# Patient Record
Sex: MALE | Employment: FULL TIME | URBAN - METROPOLITAN AREA
[De-identification: names, ages, dates, MRNs, and addresses within clinical notes are randomized per-mention and may not be internally consistent; named-entity substitution may affect disease eponyms.]

---

## 2017-01-06 ENCOUNTER — GENERIC CONVERSION - ENCOUNTER (OUTPATIENT)
Dept: OTHER | Facility: OTHER | Age: 67
End: 2017-01-06

## 2017-01-12 ENCOUNTER — GENERIC CONVERSION - ENCOUNTER (OUTPATIENT)
Dept: OTHER | Facility: OTHER | Age: 67
End: 2017-01-12

## 2017-01-13 ENCOUNTER — GENERIC CONVERSION - ENCOUNTER (OUTPATIENT)
Dept: OTHER | Facility: OTHER | Age: 67
End: 2017-01-13

## 2017-01-16 ENCOUNTER — ALLSCRIPTS OFFICE VISIT (OUTPATIENT)
Dept: OTHER | Facility: OTHER | Age: 67
End: 2017-01-16

## 2017-02-02 ENCOUNTER — GENERIC CONVERSION - ENCOUNTER (OUTPATIENT)
Dept: OTHER | Facility: OTHER | Age: 67
End: 2017-02-02

## 2017-05-05 ENCOUNTER — GENERIC CONVERSION - ENCOUNTER (OUTPATIENT)
Dept: OTHER | Facility: OTHER | Age: 67
End: 2017-05-05

## 2017-05-06 LAB
A/G RATIO (HISTORICAL): 2.1 (ref 1.2–2.2)
ALBUMIN SERPL BCP-MCNC: 4.2 G/DL (ref 3.6–4.8)
ALP SERPL-CCNC: 76 IU/L (ref 39–117)
ALT SERPL W P-5'-P-CCNC: 26 IU/L (ref 0–44)
AST SERPL W P-5'-P-CCNC: 18 IU/L (ref 0–40)
BILIRUB SERPL-MCNC: 0.4 MG/DL (ref 0–1.2)
BUN SERPL-MCNC: 16 MG/DL (ref 8–27)
BUN/CREA RATIO (HISTORICAL): 18 (ref 10–24)
CALCIUM SERPL-MCNC: 9.3 MG/DL (ref 8.6–10.2)
CHLORIDE SERPL-SCNC: 99 MMOL/L (ref 96–106)
CHOLEST SERPL-MCNC: 106 MG/DL (ref 100–199)
CO2 SERPL-SCNC: 26 MMOL/L (ref 18–29)
CREAT SERPL-MCNC: 0.87 MG/DL (ref 0.76–1.27)
EGFR AFRICAN AMERICAN (HISTORICAL): 104 ML/MIN/1.73
EGFR-AMERICAN CALC (HISTORICAL): 90 ML/MIN/1.73
GLUCOSE SERPL-MCNC: 120 MG/DL (ref 65–99)
HDLC SERPL-MCNC: 38 MG/DL
LDLC SERPL CALC-MCNC: 28 MG/DL (ref 0–99)
POTASSIUM SERPL-SCNC: 4.8 MMOL/L (ref 3.5–5.2)
SODIUM SERPL-SCNC: 141 MMOL/L (ref 134–144)
TOT. GLOBULIN, SERUM (HISTORICAL): 2 G/DL (ref 1.5–4.5)
TOTAL PROTEIN (HISTORICAL): 6.2 G/DL (ref 6–8.5)
TRIGL SERPL-MCNC: 198 MG/DL (ref 0–149)

## 2017-05-07 ENCOUNTER — GENERIC CONVERSION - ENCOUNTER (OUTPATIENT)
Dept: OTHER | Facility: OTHER | Age: 67
End: 2017-05-07

## 2017-05-07 LAB
HBA1C MFR BLD HPLC: 7.1 % (ref 4.8–5.6)
INTERPRETATION (HISTORICAL): NORMAL

## 2017-05-08 ENCOUNTER — GENERIC CONVERSION - ENCOUNTER (OUTPATIENT)
Dept: OTHER | Facility: OTHER | Age: 67
End: 2017-05-08

## 2017-05-16 ENCOUNTER — ALLSCRIPTS OFFICE VISIT (OUTPATIENT)
Dept: OTHER | Facility: OTHER | Age: 67
End: 2017-05-16

## 2017-08-24 ENCOUNTER — GENERIC CONVERSION - ENCOUNTER (OUTPATIENT)
Dept: OTHER | Facility: OTHER | Age: 67
End: 2017-08-24

## 2017-08-24 LAB
A/G RATIO (HISTORICAL): 1.9 (ref 1.2–2.2)
ALBUMIN SERPL BCP-MCNC: 4.2 G/DL (ref 3.6–4.8)
ALP SERPL-CCNC: 80 IU/L (ref 39–117)
ALT SERPL W P-5'-P-CCNC: 32 IU/L (ref 0–44)
AST SERPL W P-5'-P-CCNC: 22 IU/L (ref 0–40)
BILIRUB SERPL-MCNC: 0.4 MG/DL (ref 0–1.2)
BUN SERPL-MCNC: 17 MG/DL (ref 8–27)
BUN/CREA RATIO (HISTORICAL): 21 (ref 10–24)
CALCIUM SERPL-MCNC: 9.2 MG/DL (ref 8.6–10.2)
CHLORIDE SERPL-SCNC: 101 MMOL/L (ref 96–106)
CO2 SERPL-SCNC: 23 MMOL/L (ref 18–29)
CREAT SERPL-MCNC: 0.8 MG/DL (ref 0.76–1.27)
EGFR AFRICAN AMERICAN (HISTORICAL): 107 ML/MIN/1.73
EGFR-AMERICAN CALC (HISTORICAL): 92 ML/MIN/1.73
GLUCOSE SERPL-MCNC: 155 MG/DL (ref 65–99)
HBA1C MFR BLD HPLC: 7.1 % (ref 4.8–5.6)
POTASSIUM SERPL-SCNC: 4.4 MMOL/L (ref 3.5–5.2)
SODIUM SERPL-SCNC: 141 MMOL/L (ref 134–144)
TOT. GLOBULIN, SERUM (HISTORICAL): 2.2 G/DL (ref 1.5–4.5)
TOTAL PROTEIN (HISTORICAL): 6.4 G/DL (ref 6–8.5)

## 2017-08-25 ENCOUNTER — GENERIC CONVERSION - ENCOUNTER (OUTPATIENT)
Dept: OTHER | Facility: OTHER | Age: 67
End: 2017-08-25

## 2017-08-29 ENCOUNTER — ALLSCRIPTS OFFICE VISIT (OUTPATIENT)
Dept: OTHER | Facility: OTHER | Age: 67
End: 2017-08-29

## 2017-12-05 ENCOUNTER — ALLSCRIPTS OFFICE VISIT (OUTPATIENT)
Dept: OTHER | Facility: OTHER | Age: 67
End: 2017-12-05

## 2017-12-05 LAB — HBA1C MFR BLD HPLC: 6.5 %

## 2017-12-06 NOTE — PROGRESS NOTES
Assessment    1  Benign essential hypertension (401 1) (I10)   2  Type 2 diabetes mellitus (250 00) (E11 9)   3  Hyperlipidemia (272 4) (E78 5)   4  Former smoker (V15 82) (K85 402)   5  Influenza vaccine needed (V04 81) (Z23)    Plan  Hyperlipidemia    · (1) COMPREHENSIVE METABOLIC PANEL; Status:Canceled;    · (1) LIPID PANEL FASTING W DIRECT LDL REFLEX; Status:Canceled; Influenza vaccine needed    · Fluzone High-Dose 0 5 ML Intramuscular Suspension Prefilled Syringe;INJECT 0 5  ML Subcutaneous; To Be Done: 00DIP5845  Type 2 diabetes mellitus    · (1) COMPREHENSIVE METABOLIC PANEL; Status:Active; Requested for:97Jfo9302;    · (1) HEMOGLOBIN A1C; Status:Active; Requested for:69Kqo1981;    · (1) HEMOGLOBIN A1C; Status:Canceled;    · (1) LIPID PANEL FASTING W DIRECT LDL REFLEX; Status:Active; Requestedfor:83Vue6610;    · (1) MICROALBUMIN CREATININE RATIO, RANDOM URINE; Status:Active; Requestedfor:30Zhi0377;    · Hemoglobin A1c- POC; Status:Complete;   Done: 92KZI8462 02:45PM   · Begin a limited exercise program ; Status:Complete;   Done: 66CQQ5269   · Follow-up visit in 3 months Evaluation and Treatment  Follow-up  Status: Hold For -Scheduling  Requested for: 72HSZ3485    Discussion/Summary    Hypertension - Well controlled- last levels were controlled- His sugar is well controlled  he is going to try to see how his sugar do without the Victoza after the holidays  He doesn't think the Josuerussel Pelayo is doing much for him  Chief Complaint  follow up for hypertension      History of Present Illness  he had to start lamictal about 2 months ago  His seizure activity has gone down  He was having a few a day before the lamictal  He is down to about 3 a week  The patient states his hyperlipidemia has been under good control since the last visit  Symptoms: Associated symptoms include memory loss  Medications: the patient is adherent with his medication regimen  -- He denies medication side effects     The patient presents for follow-up of essential hypertension  The patient states he has been doing well with his blood pressure control since the last visit  Symptoms: denies chest pain  Medications: the patient is adherent with his medication regimen  -- He denies medication side effects  Review of Systems   Constitutional: No fever or chills, feels well, no tiredness, no recent weight gain or weight loss  Cardiovascular: No complaints of slow heart rate, no fast heart rate, no chest pain, no palpitations, no leg claudication, no lower extremity  Active Problems  1  Benign essential hypertension (401 1) (I10)   2  BMI 32 0-32 9,adult (V85 32) (Z68 32)   3  Hyperlipidemia (272 4) (E78 5)   4  Meningioma (225 2) (D32 9)   5  Obesity (278 00) (E66 9)   6  Seizure (780 39) (R56 9)   7  Type 2 diabetes mellitus (250 00) (E11 9)    Past Medical History    1  Benign paroxysmal positional vertigo (386 11) (H81 10)   2  History of Benign paroxysmal vertigo, unspecified laterality (386 11) (H81 10)   3  History of Cellulitis of hand (682 4) (L03 119)   4  History of Cellulitis Of The Left Fifth Toe (681 10)   5  History of Colitis (558 9) (K52 9)   6  History of acute sinusitis (V12 69) (Z87 09)   7  History of acute sinusitis (V12 69) (Z87 09)   8  History of epistaxis (V12 69) (Z87 898)   9  History of viral gastroenteritis (V12 09) (Z86 19)   10  History of Incisional hernia (553 21) (K43 2)   11  Influenza vaccine needed (V04 81) (Z23)   12  Influenza vaccine needed (V04 81) (Z23)   13  Influenza vaccine needed (V04 81) (Z23)   14  History of Knee Sprain (844 9)   15  Need for pneumococcal vaccination (V03 82) (Z23)   16  History of Open Wound Of The Finger, Complicated (539 5)   17  History of Pain of finger, unspecified laterality (729 5) (M79 646)   18  History of Screening for genitourinary condition (V81 6) (Z13 89)   19  History of Skin Abscess Of Foot (682 7)   20  History of Tinnitus, bilateral (388 30) (H93 13)   21  History of Umbilical hernia (738 4) (K42 9)   22  History of Unspecified Disorder Of Soft Tissue (729 90)   23  Well adult on routine health check (V70 0) (Z00 00)    Surgical History  1  History of Craniotomy Supratentorial Excision Of Meningioma    Family History  Mother    1  Family history of Glaucoma (365 9) (H40 9)  Father    2  Family history of Colon cancer (153 9) (C18 9)   3  Family history of malignant neoplasm of urinary bladder (V16 52) (Z80 52)   4  Family history of Paget disease of bone  Maternal Grandmother    5  Family history of Glaucoma (365 9) (H40 9)  Paternal Grandmother    10  Family history of Colon cancer (153 9) (C18 9)    Social History     · Former smoker (G36 15) (T70 545)  The social history was reviewed and updated today  Current Meds   1  Accu-Chek Guide In Vitro Strip; TEST three times a day; Therapy: 81BXV8802 to (Last Rx:26Agw3291)  Requested for: 16VYC2144 Ordered   2  Accu-Chek Guide w/Device Kit; test fasting BS three times daily Dx 250; Therapy: 81EQQ8991 to (Last Rx:06Arf5800)  Requested for: 35MUU7166 Ordered   3  Aspirin 81 MG Oral Tablet Delayed Release; 1 every day Recorded   4  Atorvastatin Calcium 10 MG Oral Tablet; Take 1 tablet daily; Therapy: (Gabriel Coronado) to Recorded   5  BD Pen Needle Albina U/F 32G X 4 MM Miscellaneous; FOR USE WITH byetta twice  daily; Therapy: 19FNZ4564 to (QHELMVVQ:23BME7443)  Requested for: 32ZNK7286; Last Rx:34Apc9750 Ordered   6  Fish Oil 1200 MG Oral Capsule; 2  Every Day; Therapy: 79Blg0798 to  Requested for: 15Rsi4782 Recorded   7  LamoTRIgine 100 MG Oral Tablet; Take 1 tablet twice daily; Therapy: 07TSL1837 to Recorded   8  LevETIRAcetam  MG Oral Tablet Extended Release 24 Hour; two in pm; Therapy: 14OTQ2433 to Recorded   9  LevETIRAcetam  MG Oral Tablet Extended Release 24 Hour; take 1 tablet in am; Therapy: 33LMW3325 to Recorded   10  Losartan Potassium 100 MG Oral Tablet; Take 1 tablet daily;   Therapy: 53Qgq2572 to (Evaluate:27Agt5810)  Requested for: 53HWJ7450; Last  Rx:87Eoc0976 Ordered   11  Melatonin 1 MG Oral Capsule; TAKE 1 CAPSULE AT BEDTIME AS NEEDED; Therapy: 87KMT2813 to Recorded   12  MetFORMIN HCl - 1000 MG Oral Tablet; take 1 tablet twice a day; Therapy: 79Xow4127 to (Last Alee Dad)  Requested for: 08Wgm2585 Ordered   13  Multi-Vitamin TABS; 1 Every Day; Therapy: 33Vib2188 to  Requested for: 09EDX8973 Recorded   14  Madina-Rul Vitamin D 2000 UNIT Oral Tablet; 1 po daily; Therapy: 74CUF7273 to  Requested for: 59Xhh8674 Recorded   15  Victoza 18 MG/3ML Subcutaneous Solution Pen-injector; 1 8mg daily sc; Therapy: 24Okc7254 to (Evaluate:28Nal3144)  Requested for: 11HXP1090; Last  Rx:17Ugc9897 Ordered    Allergies  1  Amoxicillin TABS    Vitals  Vital Signs    Recorded: 46HSC5832 02:24PM   Temperature 96 F   Heart Rate 84   Respiration 16   Systolic 051   Diastolic 76   Height Unobtainable Yes   Weight Unobtainable Yes       Physical Exam   Constitutional  General appearance: No acute distress, well appearing and well nourished  Ears, Nose, Mouth, and Throat  External inspection of ears and nose: Normal    Otoscopic examination: Tympanic membrance translucent with normal light reflex  Canals patent without erythema  Oropharynx: Normal with no erythema, edema, exudate or lesions  Pulmonary  Auscultation of lungs: Clear to auscultation, equal breath sounds bilaterally, no wheezes, no rales, no rhonci  no rales or crackles were heard bilaterally  no rhonchi  no friction rub  no wheezing  Cardiovascular  Auscultation of heart: Normal rate and rhythm, normal S1 and S2, without murmurs     Musculoskeletal  Gait and station: Normal          Results/Data  Hemoglobin A1c- POC 14AHU8081 02:45PM Mayme      Test Name Result Flag Reference   HEMOGLOBIN A1C 6 5           Provider Comments    declined tetanus shot      Signatures   Electronically signed by : Gary Bae DO; Dec  5 2017  3:00PM EST (Author)

## 2018-01-10 NOTE — PROGRESS NOTES
History of Present Illness  Care Coordination Encounter Information:   Type of Encounter: Telephonic   Last Office Visit: 1/16/17   Spoke to Patient  Care Coordination [facility] Nurse Albert Staff:   The reason for call is to discuss outreach for follow up/needed services and coordination of meeting care plan treatment goals  I reached out to Ruthie Goltz as per the [employer] and had a lengthy discussion regarding his management of diabetes  He is upset that [employer] will not cover the Contour test strips any longer  He feels the Ultra II is not as accurate and in fact he is doing his own research to compare the two  He has been living with his sister since surgery to remove a meningioma  He works part time  He is very concerned regarding his health  We discuss ways to add nutrition to his daily diet and to restrict the bread to lower his A1C  I have provided contact information to him  Active Problems    1  Benign essential hypertension (401 1) (I10)   2  BMI 32 0-32 9,adult (V85 32) (Z68 32)   3  Hyperlipidemia (272 4) (E78 5)   4  Meningioma (225 2) (D32 9)   5  Obesity (278 00) (E66 9)   6  Seizure (780 39) (R56 9)   7  Type 2 diabetes mellitus (250 00) (E11 9)    Past Medical History    1  Benign paroxysmal positional vertigo (386 11) (H81 10)   2  History of Benign paroxysmal vertigo, unspecified laterality (386 11) (H81 10)   3  History of Cellulitis of hand (682 4) (L03 119)   4  History of Cellulitis Of The Left Fifth Toe (681 10)   5  History of Colitis (558 9) (K52 9)   6  History of acute sinusitis (V12 69) (Z87 09)   7  History of acute sinusitis (V12 69) (Z87 09)   8  History of epistaxis (V12 69) (Z87 898)   9  History of viral gastroenteritis (V12 09) (Z86 19)   10  History of Incisional hernia (553 21) (K43 2)   11  Influenza vaccine needed (V04 81) (Z23)   12  Influenza vaccine needed (V04 81) (Z23)   13  Influenza vaccine needed (V04 81) (Z23)   14  History of Knee Sprain (844 9)   15  Need for pneumococcal vaccination (V03 82) (Z23)   16  History of Open Wound Of The Finger, Complicated (935 5)   17  History of Pain of finger, unspecified laterality (729 5) (M79 646)   18  History of Screening for genitourinary condition (V81 6) (Z13 89)   19  History of Skin Abscess Of Foot (682 7)   20  History of Tinnitus, bilateral (388 30) (H93 13)   21  History of Umbilical hernia (155 5) (K42 9)   22  History of Unspecified Disorder Of Soft Tissue (729 90)   23  Well adult on routine health check (V70 0) (Z00 00)    Surgical History    1  History of Craniotomy Supratentorial Excision Of Meningioma    Family History  Mother    1  Family history of Glaucoma (365 9) (H40 9)  Father    2  Family history of Colon cancer (153 9) (C18 9)   3  Family history of malignant neoplasm of urinary bladder (V16 52) (Z80 52)   4  Family history of Paget disease of bone  Maternal Grandmother    5  Family history of Glaucoma (365 9) (H40 9)  Paternal Grandmother    10  Family history of Colon cancer (153 9) (C18 9)    Social History    · Former smoker (V18 25) (K96 523)    Current Meds    1  Losartan Potassium 100 MG Oral Tablet; 1 every day; Therapy: (Recorded:13Oct2016) to Recorded    2  Atorvastatin Calcium 10 MG Oral Tablet; take 1 tablet daily at bedtime; Therapy: 46PGB7630 to (Evaluate:11Jan2018)  Requested for: 51NFH8033; Last   Rx:16Jan2017 Ordered    3  LevETIRAcetam 750 MG Oral Tablet; Take 1 tablet twice daily; Therapy: (Recorded:27Nlo5159) to Recorded    4  BD Pen Needle Albina U/F 32G X 4 MM Miscellaneous; FOR USE WITH byetta twice  daily; Therapy: 79HTY5621 to (Last Rx:26Jan2017)  Requested for: 26Jan2017 Ordered   5  BD Pen Needle Albina U/F 32G X 4 MM Miscellaneous; inject levemir once daily  dx:    250 42; Therapy: 21CIC6482 to (Last ES:49KNJ3786)  Requested for: 62XYW8364 Ordered   6   Byetta 10 MCG Pen 10 MCG/0 04ML Subcutaneous Solution Pen-injector; INJECT 10   MCG UNDER THE SKIN 1 HOUR PRIOR TO MORNING AND   EVENING MEALS; Therapy: 54GKA2753 to (Last Keena Brandyn)  Requested for: 74RAJ3776 Ordered   7  Lifescan One Touch Ultra Test Strips; test twice daily; Therapy: 57BOD4263 to (Evaluate:11Jul2017)  Requested for: 27ANB5615; Last   Rx:12Jan2017 Ordered   8  Lifescan One Touch Ultramini Meter; use as directed; Therapy: 25BZT4316 to (Last Rx:12Jan2017)  Requested for: 12Jan2017 Ordered   9  MetFORMIN HCl - 1000 MG Oral Tablet; take 1 tablet twice a day; Therapy: 99Pvd6810 to (Baldemar May)  Requested for: 72ZUW1208; Last   Rx:02Jun2016 Ordered   10  OneTouch Ultra Blue In Vitro Strip; USE TO TEST TWICE A DAY; Therapy: 32KXQ8016 to (Evaluate:67Mwe4602)  Requested for: 85LFC3172; Last    Rx:92Uwa7076 Ordered    11  Aspirin 81 MG Oral Tablet Delayed Release; 1 every day Recorded   12  Fish Oil 1200 MG Oral Capsule; 2  Every Day; Therapy: 27Eph7596 to  Requested for: 07Apr2014 Recorded   13  Melatonin 1 MG Oral Capsule; TAKE 1 CAPSULE AT BEDTIME AS NEEDED; Therapy: 34HTF7370 to Recorded   14  Multi-Vitamin TABS; 1 Every Day; Therapy: 65Kdv5993 to  Requested for: 05FQE9178 Recorded   15  Madina-Rul Vitamin D 2000 UNIT Oral Tablet; 1 po daily; Therapy: 79XGG2320 to  Requested for: 58Yrd6034 Recorded    Allergies    1  Amoxicillin TABS    End of Encounter Meds    1  Losartan Potassium 100 MG Oral Tablet; 1 every day; Therapy: (Recorded:13Oct2016) to Recorded    2  Atorvastatin Calcium 10 MG Oral Tablet; take 1 tablet daily at bedtime; Therapy: 08CEJ3700 to (Evaluate:11Jan2018)  Requested for: 46ZET0324; Last   Rx:16Jan2017 Ordered    3  LevETIRAcetam 750 MG Oral Tablet; Take 1 tablet twice daily; Therapy: (Recorded:06Kca6128) to Recorded    4  BD Pen Needle Albina U/F 32G X 4 MM Miscellaneous; FOR USE WITH byetta twice  daily; Therapy: 34GLR3106 to (Last Rx:26Jan2017)  Requested for: 26Jan2017 Ordered   5   BD Pen Needle Albina U/F 32G X 4 MM Miscellaneous; inject levemir once daily dx:    250 42; Therapy: 01EDR6342 to (Last JU:50XRB3711)  Requested for: 58VUS8685 Ordered   6  Byetta 10 MCG Pen 10 MCG/0 04ML Subcutaneous Solution Pen-injector; INJECT 10   MCG UNDER THE SKIN 1 HOUR PRIOR TO    MORNING AND   EVENING MEALS; Therapy: 38VKG2325 to (Last Geofm Christen)  Requested for: 31HBV7372 Ordered   7  Lifescan One Touch Ultra Test Strips; test twice daily; Therapy: 31MEN3239 to (Evaluate:78Ann9906)  Requested for: 09TGA7545; Last   Rx:12Jan2017 Ordered   8  Lifescan One Touch Ultramini Meter; use as directed; Therapy: 45RYY1067 to (Last Rx:12Jan2017)  Requested for: 12Jan2017 Ordered   9  MetFORMIN HCl - 1000 MG Oral Tablet; take 1 tablet twice a day; Therapy: 14Fha5086 to (Atul Amos)  Requested for: 39QPN1977; Last   Rx:02Jun2016 Ordered   10  OneTouch Ultra Blue In Vitro Strip; USE TO TEST TWICE A DAY; Therapy: 46BFU6043 to (Evaluate:78Lsa1914)  Requested for: 79KMD2374; Last    Rx:70Qzo0056 Ordered    11  Aspirin 81 MG Oral Tablet Delayed Release; 1 every day Recorded   12  Fish Oil 1200 MG Oral Capsule; 2  Every Day; Therapy: 31Lve1022 to  Requested for: 46Dlh0009 Recorded   13  Melatonin 1 MG Oral Capsule; TAKE 1 CAPSULE AT BEDTIME AS NEEDED; Therapy: 56WRY2980 to Recorded   14  Multi-Vitamin TABS; 1 Every Day; Therapy: 16Drs5069 to  Requested for: 52HCI1327 Recorded   15  Madina-Rul Vitamin D 2000 UNIT Oral Tablet; 1 po daily; Therapy: 28XBK9703 to  Requested for: 18Wfc9684 Recorded    Future Appointments    Date/Time Provider Specialty Site   04/17/2017 10:30 AM Gricelda Chaudhary[address]     Patient Care Team    Care Team Member Role Specialty Office Number   Eduardo Bull Referring Family Medicine [phone]   Joseph Molina MD Specialist Ophthalmology [phone]     Signatures   Electronically signed by :  Guilherme Richardson RN; Feb 2 2017 10:41AM EST                       (Author)

## 2018-01-11 NOTE — RESULT NOTES
Discussion/Summary   Appointment 5/16/17   Please print and put in my folder  Dr Natasha Angulo      Verified Results  (1) HEMOGLOBIN A1C 27XSJ1870 08:34AM Medardo Keen     Test Name Result Flag Reference   Hemoglobin A1c 7 1 % H 4 8-5 6   Pre-diabetes: 5 7 - 6 4           Diabetes: >6 4           Glycemic control for adults with diabetes: <7 0     [facility]) Cardiovascular Risk Assessment 84RBP5496 08:34AM Medardo Keen     Test Name Result Flag Reference   Interpretation Note     Supplement report is available  PDF Image

## 2018-01-11 NOTE — RESULT NOTES
Message   Appointment 1/16/17   Please print and put in my folder  Dr Teersa Sood      Verified Results  (1) COMPREHENSIVE METABOLIC PANEL 01KGF6270 31:80TH [email or web address]     Test Name Result Flag Reference   Glucose, Serum 140 mg/dL H 65-99   BUN 13 mg/dL  8-27   Creatinine, Serum 0 89 mg/dL  0 76-1 27   eGFR If NonAfricn Am 89 mL/min/1 73  >59   eGFR If Africn Am 103 mL/min/1 73  >59   BUN/Creatinine Ratio 15  10-22   Sodium, Serum 144 mmol/L  134-144   Potassium, Serum 4 7 mmol/L  3 5-5 2   Chloride, Serum 100 mmol/L     Carbon Dioxide, Total 27 mmol/L  18-29   Calcium, Serum 9 7 mg/dL  8 6-10 2   Protein, Total, Serum 6 5 g/dL  6 0-8 5   Albumin, Serum 4 3 g/dL  3 6-4 8   Globulin, Total 2 2 g/dL  1 5-4 5   A/G Ratio 2 0  1 1-2 5   Bilirubin, Total 0 3 mg/dL  0 0-1 2   Alkaline Phosphatase, S 93 IU/L     AST (SGOT) 22 IU/L  0-40   ALT (SGPT) 21 IU/L  0-44     (1) HEMOGLOBIN A1C 12Jan2017 07:20AM [email or web address]     Test Name Result Flag Reference   Hemoglobin A1c 7 1 % H 4 8-5 6   Pre-diabetes: 5 7 - 6 4           Diabetes: >6 4           Glycemic control for adults with diabetes: <7 0     (1) MICROALBUMIN CREATININE RATIO, RANDOM URINE 12Jan2017 07:20AM [email or web address]     Test Name Result Flag Reference   Creatinine, Urine 262 5 mg/dL  Not Estab  Microalbumin, Urine 48 9 ug/mL  Not Estab     Microalb/Creat Ratio 18 6 mg/g creat  0 0-30 0

## 2018-01-12 VITALS
TEMPERATURE: 97 F | RESPIRATION RATE: 16 BRPM | WEIGHT: 209 LBS | SYSTOLIC BLOOD PRESSURE: 130 MMHG | HEIGHT: 67 IN | DIASTOLIC BLOOD PRESSURE: 80 MMHG | HEART RATE: 84 BPM | BODY MASS INDEX: 32.8 KG/M2

## 2018-01-12 NOTE — RESULT NOTES
Message   appointment 7/7/16   Please print and put in my folder   Dr Saad Baldwin     Verified Results  (1) COMPREHENSIVE METABOLIC PANEL 80UIP6311 71:29IX Laurie Robbins     Test Name Result Flag Reference   Glucose, Serum 139 mg/dL H 65-99   BUN 19 mg/dL  8-27   Creatinine, Serum 0 76 mg/dL  0 76-1 27   eGFR If NonAfricn Am 96 mL/min/1 73  >59   eGFR If Africn Am 111 mL/min/1 73  >59   BUN/Creatinine Ratio 25 H 10-22   Sodium, Serum 141 mmol/L  134-144   Potassium, Serum 4 8 mmol/L  3 5-5 2   Chloride, Serum 101 mmol/L     Carbon Dioxide, Total 24 mmol/L  18-29   Calcium, Serum 9 2 mg/dL  8 6-10 2   Protein, Total, Serum 6 1 g/dL  6 0-8 5   Albumin, Serum 4 3 g/dL  3 6-4 8   Globulin, Total 1 8 g/dL  1 5-4 5   A/G Ratio 2 4  1 1-2 5   Bilirubin, Total 0 2 mg/dL  0 0-1 2   Alkaline Phosphatase, S 68 IU/L     AST (SGOT) 19 IU/L  0-40   ALT (SGPT) 25 IU/L  0-44     (1) HEMOGLOBIN A1C [id number] 08:04AM Laurie Robbins     Test Name Result Flag Reference   Hemoglobin A1c 7 2 % H 4 8-5 6   Pre-diabetes: 5 7 - 6 4           Diabetes: >6 4           Glycemic control for adults with diabetes: <7 0

## 2018-01-13 NOTE — RESULT NOTES
Message   Appointment 4/25/16   Please print and put in my folder   Dr Saad Baldwin     Verified Results  (1) HEMOGLOBIN A1C 22Apr2016 08:40AM Laurie Robbins     Test Name Result Flag Reference   Hemoglobin A1c 7 5 % H 4 8-5 6   Pre-diabetes: 5 7 - 6 4           Diabetes: >6 4           Glycemic control for adults with diabetes: <7 0     (1) COMPREHENSIVE METABOLIC PANEL 37STG9812 32:15BB Laurie Robbins     Test Name Result Flag Reference   Glucose, Serum 151 mg/dL H 65-99   BUN 21 mg/dL  8-27   Creatinine, Serum 0 87 mg/dL  0 76-1 27   eGFR If NonAfricn Am 91 mL/min/1 73  >59   eGFR If Africn Am 105 mL/min/1 73  >59   BUN/Creatinine Ratio 24 H 10-22   Sodium, Serum 139 mmol/L  134-144   Potassium, Serum 5 0 mmol/L  3 5-5 2   Chloride, Serum 100 mmol/L     Carbon Dioxide, Total 24 mmol/L  18-29   Calcium, Serum 9 3 mg/dL  8 6-10 2   Protein, Total, Serum 6 8 g/dL  6 0-8 5   Albumin, Serum 4 5 g/dL  3 6-4 8   Globulin, Total 2 3 g/dL  1 5-4 5   A/G Ratio 2 0  1 1-2 5   Bilirubin, Total 0 4 mg/dL  0 0-1 2   Alkaline Phosphatase, S 73 IU/L     AST (SGOT) 18 IU/L  0-40   ALT (SGPT) 30 IU/L  0-44     (1) LIPID PANEL, FASTING 22Apr2016 08:40AM Laurie Robbins     Test Name Result Flag Reference   Cholesterol, Total 142 mg/dL  100-199   Triglycerides 270 mg/dL H 0-149   HDL Cholesterol 45 mg/dL  >39   According to ATP-III Guidelines, HDL-C >59 mg/dL is considered a  negative risk factor for CHD  VLDL Cholesterol Krishan 54 mg/dL H 5-40   LDL Cholesterol Calc 43 mg/dL  0-99   T  Chol/HDL Ratio 3 2 ratio units  0 0-5 0   T  Chol/HDL Ratio                                                             Men  Women                                               1/2 Avg  Risk  3 4    3 3                                                   Avg Risk  5 0    4 4                                                2X Avg  Risk  9 6    7 1                                                3X Avg  Risk 23 4   11 0     (LC) Cardiovascular Risk Assessment [id number] 08:40AM Vernel Croissant     Test Name Result Flag Reference   Interpretation Note     Supplement report is available  PDF Image

## 2018-01-13 NOTE — RESULT NOTES
Message   Appointment 1/12/16   Please print and put in my folder   Dr Lorena Fatima     Verified Results  [facility]) Hgb A1c with eAG Estimation [id number] 08:45AM Tracie Worland     Test Name Result Flag Reference   Hemoglobin A1c 7 4 % H 4 8-5 6   Pre-diabetes: 5 7 - 6 4           Diabetes: >6 4           Glycemic control for adults with diabetes: <7 0   Estim   Avg Glu (eAG) 166 mg/dL       (1) COMPREHENSIVE METABOLIC PANEL [id number] 18:76YR Tracie Worland     Test Name Result Flag Reference   Glucose, Serum 132 mg/dL H 65-99   BUN 19 mg/dL  8-27   Creatinine, Serum 0 96 mg/dL  0 76-1 27   eGFR If NonAfricn Am 83 mL/min/1 73  >59   eGFR If Africn Am 95 mL/min/1 73  >59   BUN/Creatinine Ratio 20  10-22   Sodium, Serum 141 mmol/L  134-144   Potassium, Serum 4 6 mmol/L  3 5-5 2   Chloride, Serum 99 mmol/L     Carbon Dioxide, Total 22 mmol/L  18-29   Calcium, Serum 9 6 mg/dL  8 6-10 2   Protein, Total, Serum 6 6 g/dL  6 0-8 5   Albumin, Serum 4 3 g/dL  3 6-4 8   Globulin, Total 2 3 g/dL  1 5-4 5   A/G Ratio 1 9  1 1-2 5   Bilirubin, Total 0 5 mg/dL  0 0-1 2   Alkaline Phosphatase, S 80 IU/L     AST (SGOT) 23 IU/L  0-40   ALT (SGPT) 33 IU/L  0-44

## 2018-01-14 VITALS
TEMPERATURE: 97 F | RESPIRATION RATE: 16 BRPM | WEIGHT: 212 LBS | HEIGHT: 67 IN | BODY MASS INDEX: 33.27 KG/M2 | SYSTOLIC BLOOD PRESSURE: 136 MMHG | HEART RATE: 80 BPM | DIASTOLIC BLOOD PRESSURE: 80 MMHG

## 2018-01-15 VITALS
HEART RATE: 70 BPM | BODY MASS INDEX: 32.8 KG/M2 | DIASTOLIC BLOOD PRESSURE: 72 MMHG | TEMPERATURE: 97.2 F | HEIGHT: 67 IN | RESPIRATION RATE: 18 BRPM | SYSTOLIC BLOOD PRESSURE: 132 MMHG | WEIGHT: 209 LBS

## 2018-01-16 NOTE — RESULT NOTES
Discussion/Summary   Appointment 5/16/17   Please print and put in my folder     Dr Jad Mendez      Verified Results  (1) COMPREHENSIVE METABOLIC PANEL [id number] 00:68ZP Author Katelyn     Test Name Result Flag Reference   Glucose, Serum 120 mg/dL H 65-99   BUN 16 mg/dL  8-27   Creatinine, Serum 0 87 mg/dL  0 76-1 27   BUN/Creatinine Ratio 18  10-24   Sodium, Serum 141 mmol/L  134-144   Potassium, Serum 4 8 mmol/L  3 5-5 2   Chloride, Serum 99 mmol/L     Carbon Dioxide, Total 26 mmol/L  18-29   Calcium, Serum 9 3 mg/dL  8 6-10 2   Protein, Total, Serum 6 2 g/dL  6 0-8 5   Albumin, Serum 4 2 g/dL  3 6-4 8   Globulin, Total 2 0 g/dL  1 5-4 5   A/G Ratio 2 1  1 2-2 2   Bilirubin, Total 0 4 mg/dL  0 0-1 2   Alkaline Phosphatase, S 76 IU/L     AST (SGOT) 18 IU/L  0-40   ALT (SGPT) 26 IU/L  0-44   eGFR If NonAfricn Am 90 mL/min/1 73  >59   eGFR If Africn Am 104 mL/min/1 73  >59     (1) LIPID PANEL FASTING W DIRECT LDL REFLEX [id number] 08:34AM Author Katelyn     Test Name Result Flag Reference   Cholesterol, Total 106 mg/dL  100-199   Triglycerides 198 mg/dL H 0-149   HDL Cholesterol 38 mg/dL L >39   LDL Cholesterol Calc 28 mg/dL  0-99

## 2018-01-17 NOTE — RESULT NOTES
Discussion/Summary   Appointment 8/29/17   Please print and put in my folder     Dr Alfonso Portillo      Verified Results  (1) HEMOGLOBIN A1C [id number] 07:34AM Elenita Gruber     Test Name Result Flag Reference   Hemoglobin A1c 7 1 % H 4 8-5 6   Pre-diabetes: 5 7 - 6 4           Diabetes: >6 4           Glycemic control for adults with diabetes: <7 0     (1) COMPREHENSIVE METABOLIC PANEL [id number] 92:20RR Elenita Gruber     Test Name Result Flag Reference   Glucose, Serum 155 mg/dL H 65-99   BUN 17 mg/dL  8-27   Creatinine, Serum 0 80 mg/dL  0 76-1 27   BUN/Creatinine Ratio 21  10-24   Sodium, Serum 141 mmol/L  134-144   Potassium, Serum 4 4 mmol/L  3 5-5 2   Chloride, Serum 101 mmol/L     Carbon Dioxide, Total 23 mmol/L  18-29   Calcium, Serum 9 2 mg/dL  8 6-10 2   Protein, Total, Serum 6 4 g/dL  6 0-8 5   Albumin, Serum 4 2 g/dL  3 6-4 8   Globulin, Total 2 2 g/dL  1 5-4 5   A/G Ratio 1 9  1 2-2 2   Bilirubin, Total 0 4 mg/dL  0 0-1 2   Alkaline Phosphatase, S 80 IU/L     AST (SGOT) 22 IU/L  0-40   ALT (SGPT) 32 IU/L  0-44   eGFR If NonAfricn Am 92 mL/min/1 73  >59   eGFR If Africn Am 107 mL/min/1 73  >59

## 2018-01-18 NOTE — RESULT NOTES
Message   Appointment 10/13/16   Please print and put in my folder     Dr Leon Harvey      Verified Results  (1) COMPREHENSIVE METABOLIC PANEL [id number] 89:42GX Bradley Acosta     Test Name Result Flag Reference   Glucose, Serum 118 mg/dL H 65-99   BUN 19 mg/dL  8-27   Creatinine, Serum 0 89 mg/dL  0 76-1 27   eGFR If NonAfricn Am 89 mL/min/1 73  >59   eGFR If Africn Am 103 mL/min/1 73  >59   BUN/Creatinine Ratio 21  10-22   Sodium, Serum 141 mmol/L  134-144   **Effective October 17, 2016 the reference interval**                   for Sodium, Serum will be changing to:                                                             136 - 144   Potassium, Serum 5 0 mmol/L  3 5-5 2   **Effective October 17, 2016 the reference interval**                   for Potassium, Serum will be changing to:                                          0 -  7 days        3 7 - 5 2                                          8 - 30 days        3 7 - 6 4                                          1 -  6 months      3 8 - 6 0                                   7 months -  1 year        3 8 - 5 3                                              >1 year        3 5 - 5 2   Chloride, Serum 101 mmol/L     **Effective October 17, 2016 the reference interval**                   for Chloride, Serum will be changing to:                                                              97 - 106   Carbon Dioxide, Total 25 mmol/L  18-29   Calcium, Serum 9 5 mg/dL  8 6-10 2   Protein, Total, Serum 6 6 g/dL  6 0-8 5   Albumin, Serum 4 5 g/dL  3 6-4 8   Globulin, Total 2 1 g/dL  1 5-4 5   A/G Ratio 2 1  1 1-2 5   Bilirubin, Total 0 3 mg/dL  0 0-1 2   Alkaline Phosphatase, S 85 IU/L     AST (SGOT) 17 IU/L  0-40   ALT (SGPT) 28 IU/L  0-44     (1) HEMOGLOBIN A1C 10Oct2016 07:31AM Bradley Acosta     Test Name Result Flag Reference   Hemoglobin A1c 7 1 % H 4 8-5 6   Pre-diabetes: 5 7 - 6 4           Diabetes: >6 4           Glycemic control for adults with diabetes: <7 0 (1) LIPID PANEL FASTING W DIRECT LDL REFLEX 10Oct2016 07:31AM Medardo Keen     Test Name Result Flag Reference   Cholesterol, Total 126 mg/dL  100-199   Triglycerides 278 mg/dL H 0-149   HDL Cholesterol 39 mg/dL L >39   According to ATP-III Guidelines, HDL-C >59 mg/dL is considered a  negative risk factor for CHD  LDL Cholesterol Calc 31 mg/dL  0-99     [facility]) Cardiovascular Risk Assessment 10Oct2016 07:31AM Medardo Keen     Test Name Result Flag Reference   Interpretation Note     Supplement report is available  PDF Image

## 2018-01-18 NOTE — PROGRESS NOTES
Assessment    1  Benign essential hypertension (401 1) (I10)   2  Type 2 diabetes mellitus (250 00) (E11 9)   3  Hyperlipidemia (272 4) (E78 5)   4  Never a smoker    Plan  Hyperlipidemia    · (1) COMPREHENSIVE METABOLIC PANEL; Status:Active; Requested for:28Mar2016;    · (1) LIPID PANEL, FASTING; Status:Active; Requested for:28Mar2016;    · Follow-up visit in 3 months Evaluation and Treatment  Follow-up  Status: Hold For -  Scheduling  Requested for: 12Jan2016  Type 2 diabetes mellitus    · Invokana 300 MG Oral Tablet   · Victoza 18 MG/3ML Subcutaneous Solution Pen-injector; Inject 1 8mg (0 3 ml) daily   · Begin a limited exercise program ; Status:Complete;   Done: 20KSN9454   · Inspect your feet daily ; Status:Complete;   Done: 18NNS0989   · Some eating tips that can help you lose weight ; Status:Complete;   Done: 24CDH4769   · (1) HEMOGLOBIN A1C; Status:Active; Requested for:28Mar2016;    · *VB - Eye Exam; Status:Active; Requested JAW:[id number];   Type 2 diabetes mellitus,     · Januvia 100 MG Oral Tablet    Discussion/Summary    Hypertension - stable  Diabetes - A1c is 7 4; he is ready to try the Victoza; sample given  hyperlipidemia - stable, due for labs in 3 months  Possible side effects of new medications were reviewed with the patient/guardian today  Chief Complaint  review new labs for sugar & cholesterol levels      History of Present Illness  Due to concerns about the risk of the medications he stopped the Januvia and Invokana  Also, they weren't helping his sugars  He has noticed a 7 point drop statistically when stopping the medication  The patient presents for follow-up of essential hypertension  The patient states he has been doing well with his blood pressure control since the last visit  Symptoms:   Medications: the patient is adherent with his medication regimen  He denies medication side effects  Disease Management: the patient is doing well with his blood pressure goals   Goals for hypertension management: Blood pressure: at or near goal  Weight: not yet at goal       Review of Systems    Constitutional: No fever or chills, feels well, no tiredness, no recent weight gain or weight loss  Cardiovascular: No complaints of slow heart rate, no fast heart rate, no chest pain, no palpitations, no leg claudication, no lower extremity  Active Problems    1  Benign essential hypertension (401 1) (I10)   2  Colitis (558 9) (K52 9)   3  Hyperlipidemia (272 4) (E78 5)   4  Obesity (278 00) (E66 9)   5  Screening for genitourinary condition (V81 6) (Z13 89)   6  Tinnitus, bilateral (388 30) (H93 13)   7  Type 2 diabetes mellitus (250 00) (E11 9)    Past Medical History    1  Benign paroxysmal positional vertigo (386 11) (H81 10)   2  History of Benign paroxysmal vertigo, unspecified laterality (386 11) (H81 10)   3  History of Cellulitis of hand (682 4) (L03 119)   4  History of Cellulitis Of The Left Fifth Toe (681 10)   5  History of acute sinusitis (V12 69) (Z87 09)   6  History of acute sinusitis (V12 69) (Z87 09)   7  History of epistaxis (V12 69) (Z87 898)   8  History of viral gastroenteritis (V12 09) (Z86 19)   9  History of Incisional hernia (553 21) (K43 2)   10  Influenza vaccine needed (V04 81) (Z23)   11  Influenza vaccine needed (V04 81) (Z23)   12  History of Knee Sprain (844 9)   13  Need for pneumococcal vaccination (V03 82) (Z23)   14  History of Open Wound Of The Finger, Complicated (731 8)   15  History of Pain of finger, unspecified laterality (729 5) (M79 646)   16  History of Skin Abscess Of Foot (682 7)   17  History of Umbilical hernia (088 5) (K42 9)   18  History of Unspecified Disorder Of Soft Tissue (729 90)   19  Well adult on routine health check (V70 0) (Z00 00)    Family History    1  Family history of Glaucoma (365 9) (H40 9)    2  Family history of Colon cancer (153 9) (C18 9)    3  Family history of Glaucoma (365 9) (H40 9)    4   Family history of Colon cancer (153  9) (C18 9)    Social History    · Never a smoker  The social history was reviewed and updated today  Current Meds   1  Aspirin 81 MG Oral Tablet; Take 1 tablet daily; Therapy: 91UIS9352 to Recorded   2  Atorvastatin Calcium 10 MG Oral Tablet; take 1 tablet daily at bedtime; Therapy: 85PKO3412 to (Evaluate:91Vbj3257)  Requested for: 34JJL8106; Last   Rx:96Tey7083 Ordered   3  [employer]; test twice daily; Dx:  250; Therapy: 78Qrk1709 to (Last Rx:2015)  Requested for: 13Mvi0700 Ordered   4  BD Pen Needle Albina U/F 32G X 4 MM Miscellaneous; FOR USE WITH BYETTA   INJECTION TWICE DAILY; Therapy: 86IGF7203 to (Last Rx:56Emq1478)  Requested for: 45Kin8809 Ordered   5  Fish Oil 1200 MG Oral Capsule; 2  Every Day; Therapy: 65Rgb4725 to  Requested for: 16Gtn6495 Recorded   6  Invokana 300 MG Oral Tablet; Take 1 tablet daily; Therapy: 58Nse1405 to (Evaluate:85Jmn0313)  Requested for: 10IKA4927; Last   Rx:20Mzl5999 Ordered   7  Januvia 100 MG Oral Tablet; TAKE 1 TABLET DAILY; Therapy: 07KNP5445 to (Evaluate:76Jij3415)  Requested for: 19HLL8773; Last   Rx:36Tpf8113 Ordered   8  Losartan Potassium 100 MG Oral Tablet; Take 1 tablet daily; Therapy: 93Dxc5715 to (Saleh Achilles)  Requested for: 04IZZ7409; Last   N52ZBD3986 Ordered   9  Melatonin 3 MG Oral Tablet; 1 po daily at HS; Therapy: 61VZX6896 to  Requested for: 41XWE4985 Recorded   10  MetFORMIN HCl - 1000 MG Oral Tablet; take 1 tablet twice a day; Therapy: 62Lav0783 to (Saleh Achilles)  Requested for: 02ZBK2122; Last    DR:49SWH1052 Ordered   11  Multi-Vitamin TABS; 1 Every Day; Therapy: 29Dbz7399 to  Requested for: 81AIA4246 Recorded   12  Vitamin D3 1000 UNIT Oral Tablet; 1 Every Day; Therapy: 17HYJ4284 to  Requested for: 37RWC5430 Recorded    Allergies    1   Amoxicillin TABS    Vitals  Vital Signs [Data Includes: Current Encounter]    Recorded: [id number] 11:04AM Recorded: 17Xhi1701 10:45AM Temperature  96 F   Heart Rate  72   Respiration  16   Systolic 148 307   Diastolic 72 76   Height  5 ft 7 in   Weight  212 lb    BMI Calculated  33 2   BSA Calculated  2 07     Physical Exam    Constitutional   General appearance: No acute distress, well appearing and well nourished  Ears, Nose, Mouth, and Throat   External inspection of ears and nose: Normal     Otoscopic examination: Tympanic membrance translucent with normal light reflex  Canals patent without erythema  Pulmonary   Auscultation of lungs: Clear to auscultation, equal breath sounds bilaterally, no wheezes, no rales, no rhonci  Cardiovascular   Auscultation of heart: Normal rate and rhythm, normal S1 and S2, without murmurs  Musculoskeletal   Gait and station: Normal          Results/Data  Results   () Hgb A1c with eAG Estimation [id number] 08:45AM relocality     Test Name Result Flag Reference   Hemoglobin A1c 7 4 % H 4 8-5 6   Pre-diabetes: 5 7 - 6 4           Diabetes: >6 4           Glycemic control for adults with diabetes: <7 0   Estim   Avg Glu (eAG) 166 mg/dL       (1) COMPREHENSIVE METABOLIC PANEL 99PNR0841 89:02VL relocality     Test Name Result Flag Reference   Glucose, Serum 132 mg/dL H 65-99   BUN 19 mg/dL  8-27   Creatinine, Serum 0 96 mg/dL  0 76-1 27   eGFR If NonAfricn Am 83 mL/min/1 73  >59   eGFR If Africn Am 95 mL/min/1 73  >59   BUN/Creatinine Ratio 20  10-22   Sodium, Serum 141 mmol/L  134-144   Potassium, Serum 4 6 mmol/L  3 5-5 2   Chloride, Serum 99 mmol/L     Carbon Dioxide, Total 22 mmol/L  18-29   Calcium, Serum 9 6 mg/dL  8 6-10 2   Protein, Total, Serum 6 6 g/dL  6 0-8 5   Albumin, Serum 4 3 g/dL  3 6-4 8   Globulin, Total 2 3 g/dL  1 5-4 5   A/G Ratio 1 9  1 1-2 5   Bilirubin, Total 0 5 mg/dL  0 0-1 2   Alkaline Phosphatase, S 80 IU/L     AST (SGOT) 23 IU/L  0-40   ALT (SGPT) 33 IU/L  0-44     Signatures   Electronically signed by : Irene Calhoun DO; Jan 12 2016 11:13AM EST (Author)

## 2018-01-22 VITALS
RESPIRATION RATE: 16 BRPM | DIASTOLIC BLOOD PRESSURE: 76 MMHG | HEART RATE: 84 BPM | TEMPERATURE: 96 F | SYSTOLIC BLOOD PRESSURE: 126 MMHG

## 2018-01-29 ENCOUNTER — TELEPHONE (OUTPATIENT)
Dept: FAMILY MEDICINE CLINIC | Facility: CLINIC | Age: 68
End: 2018-01-29

## 2018-02-02 ENCOUNTER — DOCUMENTATION (OUTPATIENT)
Dept: FAMILY MEDICINE CLINIC | Facility: CLINIC | Age: 68
End: 2018-02-02

## 2018-02-02 NOTE — TELEPHONE ENCOUNTER
Called pt who did not know the number for the PA  Still waiting for a card with the  New insurance ID and PA number    Pt also unaware at this time what Diabetic Supplies will be covered by his new insurance  [email or web address]

## 2018-02-05 NOTE — PROGRESS NOTES
PA done over the phone  [facility] aware problems with fax  Stated they would call with a decision or more questions  Claim number [id number]  PA decision to be made soon     klpn

## 2018-02-07 DIAGNOSIS — E78.2 MIXED HYPERLIPIDEMIA: Primary | ICD-10-CM

## 2018-02-07 RX ORDER — ATORVASTATIN CALCIUM 10 MG/1
1 TABLET, FILM COATED ORAL DAILY
COMMUNITY
End: 2018-02-07 | Stop reason: SDUPTHER

## 2018-02-07 RX ORDER — ATORVASTATIN CALCIUM 10 MG/1
10 TABLET, FILM COATED ORAL DAILY
Qty: 90 TABLET | Refills: 1 | Status: SHIPPED | OUTPATIENT
Start: 2018-02-07 | End: 2018-04-28 | Stop reason: SDUPTHER

## 2018-02-09 DIAGNOSIS — E11.9 TYPE 2 DIABETES MELLITUS WITHOUT COMPLICATION, WITHOUT LONG-TERM CURRENT USE OF INSULIN (HCC): Primary | ICD-10-CM

## 2018-02-11 RX ORDER — AMOXICILLIN 500 MG
CAPSULE ORAL 2 TIMES DAILY
COMMUNITY
Start: 2007-04-20

## 2018-02-11 RX ORDER — LEVETIRACETAM 500 MG/1
750 TABLET, EXTENDED RELEASE ORAL DAILY
COMMUNITY
Start: 2017-12-05 | End: 2019-09-09 | Stop reason: ALTCHOICE

## 2018-02-11 RX ORDER — LEVETIRACETAM 750 MG/1
TABLET, EXTENDED RELEASE ORAL
COMMUNITY
Start: 2017-05-16 | End: 2018-03-09 | Stop reason: DRUGHIGH

## 2018-02-11 RX ORDER — LOSARTAN POTASSIUM 100 MG/1
1 TABLET ORAL DAILY
COMMUNITY
Start: 2014-04-07 | End: 2018-10-10 | Stop reason: SDUPTHER

## 2018-02-11 RX ORDER — LAMOTRIGINE 100 MG/1
1 TABLET ORAL 2 TIMES DAILY
COMMUNITY
Start: 2017-12-05 | End: 2018-03-06 | Stop reason: SDUPTHER

## 2018-02-13 LAB
LEFT EYE DIABETIC RETINOPATHY: NORMAL
RIGHT EYE DIABETIC RETINOPATHY: NORMAL

## 2018-03-05 PROCEDURE — 3072F LOW RISK FOR RETINOPATHY: CPT | Performed by: FAMILY MEDICINE

## 2018-03-06 LAB
ALBUMIN SERPL-MCNC: 4.5 G/DL (ref 3.6–4.8)
ALBUMIN/CREAT UR: 149.1 MG/G CREAT (ref 0–30)
ALBUMIN/GLOB SERPL: 1.8 {RATIO} (ref 1.2–2.2)
ALP SERPL-CCNC: 84 IU/L (ref 39–117)
ALT SERPL-CCNC: 23 IU/L (ref 0–44)
AST SERPL-CCNC: 18 IU/L (ref 0–40)
BILIRUB SERPL-MCNC: 0.3 MG/DL (ref 0–1.2)
BUN SERPL-MCNC: 17 MG/DL (ref 8–27)
BUN/CREAT SERPL: 17 (ref 10–24)
CALCIUM SERPL-MCNC: 9.4 MG/DL (ref 8.6–10.2)
CHLORIDE SERPL-SCNC: 98 MMOL/L (ref 96–106)
CHOLEST SERPL-MCNC: 141 MG/DL (ref 100–199)
CO2 SERPL-SCNC: 27 MMOL/L (ref 18–29)
CREAT SERPL-MCNC: 0.99 MG/DL (ref 0.76–1.27)
CREAT UR-MCNC: 180.1 MG/DL
GLOBULIN SER-MCNC: 2.5 G/DL (ref 1.5–4.5)
GLUCOSE SERPL-MCNC: 138 MG/DL (ref 65–99)
HBA1C MFR BLD: 7 % (ref 4.8–5.6)
HDLC SERPL-MCNC: 45 MG/DL
LDLC SERPL CALC-MCNC: 53 MG/DL (ref 0–99)
MICROALBUMIN UR-MCNC: 268.6 UG/ML
MICRODELETION SYND BLD/T FISH: NORMAL
POTASSIUM SERPL-SCNC: 4.3 MMOL/L (ref 3.5–5.2)
PROT SERPL-MCNC: 7 G/DL (ref 6–8.5)
SL AMB EGFR AFRICAN AMERICAN: 91 ML/MIN/1.73
SL AMB EGFR NON AFRICAN AMERICAN: 78 ML/MIN/1.73
SODIUM SERPL-SCNC: 140 MMOL/L (ref 134–144)
TRIGL SERPL-MCNC: 217 MG/DL (ref 0–149)

## 2018-03-06 RX ORDER — LAMOTRIGINE 50 MG/1
TABLET, EXTENDED RELEASE ORAL
COMMUNITY
Start: 2018-02-14 | End: 2018-06-18 | Stop reason: CLARIF

## 2018-03-06 RX ORDER — LAMOTRIGINE 100 MG/1
TABLET, EXTENDED RELEASE ORAL
COMMUNITY
Start: 2018-02-14 | End: 2018-06-18 | Stop reason: CLARIF

## 2018-03-09 ENCOUNTER — OFFICE VISIT (OUTPATIENT)
Dept: FAMILY MEDICINE CLINIC | Facility: CLINIC | Age: 68
End: 2018-03-09
Payer: COMMERCIAL

## 2018-03-09 VITALS
BODY MASS INDEX: 33.24 KG/M2 | DIASTOLIC BLOOD PRESSURE: 80 MMHG | HEIGHT: 67 IN | SYSTOLIC BLOOD PRESSURE: 142 MMHG | HEART RATE: 78 BPM | WEIGHT: 211.8 LBS | TEMPERATURE: 97.1 F | RESPIRATION RATE: 18 BRPM

## 2018-03-09 DIAGNOSIS — I10 BENIGN ESSENTIAL HYPERTENSION: ICD-10-CM

## 2018-03-09 DIAGNOSIS — Z12.5 PROSTATE CANCER SCREENING: ICD-10-CM

## 2018-03-09 DIAGNOSIS — Z11.59 NEED FOR HEPATITIS C SCREENING TEST: ICD-10-CM

## 2018-03-09 DIAGNOSIS — E78.2 MIXED HYPERLIPIDEMIA: ICD-10-CM

## 2018-03-09 DIAGNOSIS — E11.9 TYPE 2 DIABETES MELLITUS WITHOUT COMPLICATION, WITHOUT LONG-TERM CURRENT USE OF INSULIN (HCC): Primary | ICD-10-CM

## 2018-03-09 PROCEDURE — 99214 OFFICE O/P EST MOD 30 MIN: CPT | Performed by: FAMILY MEDICINE

## 2018-03-09 NOTE — PROGRESS NOTES
Assessment/Plan:     Diabetes - not as controlled, A1c is up from 6 5 to 7, will work on diet  Hypertension - stable  Hyperlipidemia - well controlled  Diagnoses and all orders for this visit:    Type 2 diabetes mellitus without complication, without long-term current use of insulin (HCC)  -     HEMOGLOBIN A1C W/ EAG ESTIMATION; Future    Benign essential hypertension  -     Comprehensive metabolic panel; Future  -     CBC; Future    Mixed hyperlipidemia    Need for hepatitis C screening test  -     Hepatitis C antibody; Future    Prostate cancer screening  -     PSA; Future          Subjective:     Patient ID: Pretty Mccoy is a 79 y o  male  His neurologist has been changing his medication around  He did take some ibuprofen yesterday and today because he fell on ice yesterday  Hypertension   This is a chronic problem  The current episode started more than 1 year ago  The problem is controlled  Pertinent negatives include no anxiety or chest pain  The current treatment provides moderate improvement  There are no compliance problems  Diabetes   He presents for his follow-up diabetic visit  He has type 2 diabetes mellitus  His disease course has been worsening  Hypoglycemia symptoms include confusion  Pertinent negatives for diabetes include no chest pain  He is compliant with treatment all of the time  His weight is stable  He is following a diabetic diet  Review of Systems   Constitutional: Negative for fever  Cardiovascular: Negative for chest pain  Neurological:        Unsteady gait   Psychiatric/Behavioral: Positive for confusion  Objective:     Physical Exam   Constitutional: He appears well-developed and well-nourished  HENT:   Head: Normocephalic and atraumatic  Right Ear: External ear normal    Left Ear: External ear normal    Mouth/Throat: Oropharynx is clear and moist    Cardiovascular: Normal rate, regular rhythm and normal heart sounds      No murmur heard   Pulmonary/Chest: Effort normal and breath sounds normal  No respiratory distress  He has no wheezes  He has no rales  Musculoskeletal: He exhibits no edema or tenderness  Nursing note and vitals reviewed

## 2018-03-09 NOTE — PATIENT INSTRUCTIONS
Recent Results (from the past 504 hour(s))   Comprehensive metabolic panel    Collection Time: 03/05/18  8:09 AM   Result Value Ref Range    SL AMB GLUCOSE 138 (H) 65 - 99 mg/dL    BUN 17 8 - 27 mg/dL    Creatinine, Serum 0 99 0 76 - 1 27 mg/dL    eGFR Non African American 78 >59 mL/min/1 73    SL AMB EGFR AFRICAN AMERICAN 91 >59 mL/min/1 73    SL AMB BUN/CREATININE RATIO 17 10 - 24    SL AMB SODIUM 140 134 - 144 mmol/L    SL AMB POTASSIUM 4 3 3 5 - 5 2 mmol/L    SL AMB CHLORIDE 98 96 - 106 mmol/L    SL AMB CARBON DIOXIDE 27 18 - 29 mmol/L    CALCIUM 9 4 8 6 - 10 2 mg/dL    SL AMB PROTEIN, TOTAL 7 0 6 0 - 8 5 g/dL    Serum Albumin 4 5 3 6 - 4 8 g/dL    Globulin, Total 2 5 1 5 - 4 5 g/dL    SL AMB ALBUMIN/GLOBULIN RATIO 1 8 1 2 - 2 2    SL AMB BILIRUBIN, TOTAL 0 3 0 0 - 1 2 mg/dL    Alk Phos Isoenzymes 84 39 - 117 IU/L    SL AMB AST 18 0 - 40 IU/L    SL AMB ALT 23 0 - 44 IU/L   Lipid panel    Collection Time: 03/05/18  8:09 AM   Result Value Ref Range    Cholesterol, Total 141 100 - 199 mg/dL    Triglycerides 217 (H) 0 - 149 mg/dL    SL AMB HDL CHOLESTEROL 45 >39 mg/dL    SL AMB LDL-CHOLESTEROL 53 0 - 99 mg/dL   Microalbumin / creatinine urine ratio    Collection Time: 03/05/18  8:09 AM   Result Value Ref Range    Creatinine, Urine 180 1 Not Estab  mg/dL    Microalbum  ,U,Random 268 6 Not Estab  ug/mL    Microalb/Creat Ratio 149 1 (H) 0 0 - 30 0 mg/g creat   Hemoglobin A1c    Collection Time: 03/05/18  8:09 AM   Result Value Ref Range    Hemoglobin A1C 7 0 (H) 4 8 - 5 6 %   Cardiovascular Report    Collection Time: 03/05/18  8:09 AM   Result Value Ref Range    SL AMB INTERPRETATION Note

## 2018-03-25 DIAGNOSIS — E11.9 TYPE 2 DIABETES MELLITUS WITHOUT COMPLICATION, WITHOUT LONG-TERM CURRENT USE OF INSULIN (HCC): Primary | ICD-10-CM

## 2018-04-28 DIAGNOSIS — E78.2 MIXED HYPERLIPIDEMIA: ICD-10-CM

## 2018-04-29 RX ORDER — ATORVASTATIN CALCIUM 10 MG/1
TABLET, FILM COATED ORAL
Qty: 90 TABLET | Refills: 3 | Status: SHIPPED | OUTPATIENT
Start: 2018-04-29 | End: 2019-05-13 | Stop reason: SDUPTHER

## 2018-06-14 RX ORDER — LAMOTRIGINE 200 MG/1
300 TABLET, EXTENDED RELEASE ORAL 2 TIMES DAILY
COMMUNITY
Start: 2018-03-26 | End: 2019-09-09 | Stop reason: ALTCHOICE

## 2018-06-15 LAB
ALBUMIN SERPL-MCNC: 4.1 G/DL (ref 3.6–4.8)
ALBUMIN/GLOB SERPL: 1.8 {RATIO} (ref 1.2–2.2)
ALP SERPL-CCNC: 78 IU/L (ref 39–117)
ALT SERPL-CCNC: 19 IU/L (ref 0–44)
AMBIG ABBREV DEFAULT: NORMAL
AST SERPL-CCNC: 18 IU/L (ref 0–40)
BASOPHILS # BLD AUTO: 0 X10E3/UL (ref 0–0.2)
BASOPHILS NFR BLD AUTO: 1 %
BILIRUB SERPL-MCNC: 0.3 MG/DL (ref 0–1.2)
BUN SERPL-MCNC: 18 MG/DL (ref 8–27)
BUN/CREAT SERPL: 18 (ref 10–24)
CALCIUM SERPL-MCNC: 9.5 MG/DL (ref 8.6–10.2)
CHLORIDE SERPL-SCNC: 100 MMOL/L (ref 96–106)
CO2 SERPL-SCNC: 26 MMOL/L (ref 20–29)
CREAT SERPL-MCNC: 1.01 MG/DL (ref 0.76–1.27)
EOSINOPHIL # BLD AUTO: 0.1 X10E3/UL (ref 0–0.4)
EOSINOPHIL NFR BLD AUTO: 2 %
ERYTHROCYTE [DISTWIDTH] IN BLOOD BY AUTOMATED COUNT: 14.1 % (ref 12.3–15.4)
EST. AVERAGE GLUCOSE BLD GHB EST-MCNC: 143 MG/DL
GLOBULIN SER-MCNC: 2.3 G/DL (ref 1.5–4.5)
GLUCOSE SERPL-MCNC: 120 MG/DL (ref 65–99)
HBA1C MFR BLD: 6.6 % (ref 4.8–5.6)
HCT VFR BLD AUTO: 42.6 % (ref 37.5–51)
HCV AB S/CO SERPL IA: 0.1 S/CO RATIO (ref 0–0.9)
HGB BLD-MCNC: 13.9 G/DL (ref 13–17.7)
IMM GRANULOCYTES # BLD: 0 X10E3/UL (ref 0–0.1)
IMM GRANULOCYTES NFR BLD: 0 %
LYMPHOCYTES # BLD AUTO: 1.5 X10E3/UL (ref 0.7–3.1)
LYMPHOCYTES NFR BLD AUTO: 28 %
MCH RBC QN AUTO: 29 PG (ref 26.6–33)
MCHC RBC AUTO-ENTMCNC: 32.6 G/DL (ref 31.5–35.7)
MCV RBC AUTO: 89 FL (ref 79–97)
MONOCYTES # BLD AUTO: 0.4 X10E3/UL (ref 0.1–0.9)
MONOCYTES NFR BLD AUTO: 7 %
NEUTROPHILS # BLD AUTO: 3.3 X10E3/UL (ref 1.4–7)
NEUTROPHILS NFR BLD AUTO: 62 %
PLATELET # BLD AUTO: 181 X10E3/UL (ref 150–379)
POTASSIUM SERPL-SCNC: 4.5 MMOL/L (ref 3.5–5.2)
PROT SERPL-MCNC: 6.4 G/DL (ref 6–8.5)
PSA SERPL-MCNC: 1.6 NG/ML (ref 0–4)
RBC # BLD AUTO: 4.79 X10E6/UL (ref 4.14–5.8)
SL AMB EGFR AFRICAN AMERICAN: 89 ML/MIN/1.73
SL AMB EGFR NON AFRICAN AMERICAN: 77 ML/MIN/1.73
SODIUM SERPL-SCNC: 142 MMOL/L (ref 134–144)
WBC # BLD AUTO: 5.3 X10E3/UL (ref 3.4–10.8)

## 2018-06-18 ENCOUNTER — OFFICE VISIT (OUTPATIENT)
Dept: FAMILY MEDICINE CLINIC | Facility: CLINIC | Age: 68
End: 2018-06-18
Payer: COMMERCIAL

## 2018-06-18 VITALS
DIASTOLIC BLOOD PRESSURE: 64 MMHG | TEMPERATURE: 97.1 F | SYSTOLIC BLOOD PRESSURE: 124 MMHG | HEART RATE: 84 BPM | BODY MASS INDEX: 31.08 KG/M2 | WEIGHT: 198 LBS | RESPIRATION RATE: 16 BRPM | HEIGHT: 67 IN

## 2018-06-18 DIAGNOSIS — E11.9 TYPE 2 DIABETES MELLITUS WITHOUT COMPLICATION, WITHOUT LONG-TERM CURRENT USE OF INSULIN (HCC): ICD-10-CM

## 2018-06-18 DIAGNOSIS — I10 BENIGN ESSENTIAL HYPERTENSION: Primary | ICD-10-CM

## 2018-06-18 DIAGNOSIS — E78.2 MIXED HYPERLIPIDEMIA: ICD-10-CM

## 2018-06-18 PROBLEM — K59.03 DRUG-INDUCED CONSTIPATION: Status: ACTIVE | Noted: 2018-06-18

## 2018-06-18 PROCEDURE — 3008F BODY MASS INDEX DOCD: CPT | Performed by: FAMILY MEDICINE

## 2018-06-18 PROCEDURE — 99214 OFFICE O/P EST MOD 30 MIN: CPT | Performed by: FAMILY MEDICINE

## 2018-06-18 RX ORDER — CHOLECALCIFEROL (VITAMIN D3) 125 MCG
500 CAPSULE ORAL DAILY
COMMUNITY
End: 2019-09-09 | Stop reason: ALTCHOICE

## 2018-06-18 NOTE — PROGRESS NOTES
Assessment/Plan:    Problem List Items Addressed This Visit     Benign essential hypertension - Primary     Well controlled         Relevant Orders    CBC    Comprehensive metabolic panel    Mixed hyperlipidemia     Stable  Continue atorvastatin 10 mg         Relevant Orders    Comprehensive metabolic panel    Lipid Panel with Direct LDL reflex    Type 2 diabetes mellitus without complication, without long-term current use of insulin (HCC)     Lab Results   Component Value Date    HGBA1C 6 6 (H) 06/14/2018       No results for input(s): POCGLU in the last 72 hours  Blood Sugar Average: Last 72 hrs:    Improved, A1c is down to 6 6            Relevant Orders    Comprehensive metabolic panel    Hemoglobin A1C          There are no Patient Instructions on file for this visit  Return in about 3 months (around 9/18/2018) for Next scheduled follow up  Subjective:      Patient ID: Madi Morgan is a 79 y o  male  Chief Complaint   Patient presents with    Follow-up     review new labs-lj       He has been Miralax and colace for constipation  He is getting the constipation from his increased seizure medication dose  He is getting light headed with exertion  His balance is off terribly  His neurologist       Hypertension   This is a chronic problem  The current episode started more than 1 year ago  The problem is unchanged  The problem is controlled  Past treatments include angiotensin blockers  The current treatment provides moderate improvement  Hyperlipidemia   This is a chronic problem  The current episode started more than 1 year ago  The problem is controlled  Recent lipid tests were reviewed and are normal  Exacerbating diseases include diabetes  Current antihyperlipidemic treatment includes statins  The current treatment provides moderate improvement of lipids         The following portions of the patient's history were reviewed and updated as appropriate:  past social history    Review of Systems   Respiratory: Negative  Gastrointestinal: Positive for constipation  Neurological:        No recent seizures         Current Outpatient Prescriptions   Medication Sig Dispense Refill    aspirin 81 MG tablet Take by mouth daily      atorvastatin (LIPITOR) 10 mg tablet TAKE 1 TABLET BY MOUTH  DAILY 90 tablet 3    Cholecalciferol (BEN-RUL VITAMIN D) 2000 units TABS Take by mouth daily      cyanocobalamin (VITAMIN B-12) 500 mcg tablet Take 500 mcg by mouth daily      glucose blood (ACCU-CHEK GUIDE) test strip by In Vitro route 3 (three) times a day      LamoTRIgine  MG TB24 200 mg 2 (two) times a day        levETIRAcetam (KEPPRA XR) 500 MG 24 hr tablet Take 500 mg by mouth Take 1 tablet in the AM and 2 tablets in the PM       Liraglutide (VICTOZA) 18 MG/3ML SOPN Inject under the skin      losartan (COZAAR) 100 MG tablet Take 1 tablet by mouth daily      Melatonin 3 MG CAPS Take 3 capsules by mouth        metFORMIN (GLUCOPHAGE) 1000 MG tablet TAKE 1 TABLET TWICE A  tablet 3    Multiple Vitamin (MULTI-VITAMIN DAILY PO) Take by mouth daily      Omega-3 Fatty Acids (FISH OIL) 1200 MG CAPS Take by mouth 2 (two) times a day        Insulin Pen Needle 32G X 4 MM MISC by Does not apply route daily for 90 days 100 each 1     No current facility-administered medications for this visit  Objective:    /64   Pulse 84   Temp (!) 97 1 °F (36 2 °C)   Resp 16   Ht 5' 6 5" (1 689 m)   Wt 89 8 kg (198 lb)   BMI 31 48 kg/m²        Physical Exam   Constitutional: He appears well-developed and well-nourished  HENT:   Head: Normocephalic and atraumatic  Right Ear: External ear normal    Left Ear: External ear normal    Mouth/Throat: Oropharynx is clear and moist    Cardiovascular: Normal rate, regular rhythm and normal heart sounds  No murmur heard  Pulses:       Dorsalis pedis pulses are 2+ on the right side, and 2+ on the left side          Posterior tibial pulses are 2+ on the right side, and 2+ on the left side  Pulmonary/Chest: Effort normal and breath sounds normal  No respiratory distress  He has no wheezes  He has no rales  Musculoskeletal: He exhibits no edema or tenderness  Feet:   Right Foot:   Skin Integrity: Negative for ulcer, skin breakdown, erythema, warmth, callus or dry skin  Left Foot:   Skin Integrity: Negative for ulcer, skin breakdown, erythema, warmth, callus or dry skin  Nursing note and vitals reviewed  Patient's shoes and socks removed  Right Foot/Ankle   Right Foot Inspection  Skin Exam: skin normal skin not intact, no dry skin, no warmth, no callus, no erythema, no maceration, no abnormal color, no pre-ulcer, no ulcer and no callus                          Toe Exam: ROM and strength within normal limits  Sensory       Monofilament testing: intact  Vascular  Capillary refills: < 3 seconds  The right DP pulse is 2+  The right PT pulse is 2+  Left Foot/Ankle  Left Foot Inspection  Skin Exam: skin normalskin not intact, no dry skin, no warmth, no erythema, no maceration, normal color, no pre-ulcer, no ulcer and no callus                         Toe Exam: ROM and strength within normal limits                   Sensory       Monofilament: intact  Vascular  Capillary refills: < 3 seconds  The left DP pulse is 2+  The left PT pulse is 2+     Assign Risk Category:  No deformity present; ;        Risk: 0      Abby Juarez DO

## 2018-06-18 NOTE — ASSESSMENT & PLAN NOTE
Lab Results   Component Value Date    HGBA1C 6 6 (H) 06/14/2018       No results for input(s): POCGLU in the last 72 hours      Blood Sugar Average: Last 72 hrs:    Improved, A1c is down to 6 6

## 2018-07-30 DIAGNOSIS — E11.9 TYPE 2 DIABETES MELLITUS WITHOUT COMPLICATION, WITHOUT LONG-TERM CURRENT USE OF INSULIN (HCC): Primary | ICD-10-CM

## 2018-07-30 RX ORDER — BLOOD SUGAR DIAGNOSTIC
STRIP MISCELLANEOUS
Qty: 300 EACH | Refills: 1 | Status: SHIPPED | OUTPATIENT
Start: 2018-07-30 | End: 2019-04-04 | Stop reason: SDUPTHER

## 2018-10-05 ENCOUNTER — OFFICE VISIT (OUTPATIENT)
Dept: FAMILY MEDICINE CLINIC | Facility: CLINIC | Age: 68
End: 2018-10-05
Payer: COMMERCIAL

## 2018-10-05 VITALS
DIASTOLIC BLOOD PRESSURE: 76 MMHG | RESPIRATION RATE: 16 BRPM | BODY MASS INDEX: 32.65 KG/M2 | HEIGHT: 67 IN | HEART RATE: 72 BPM | SYSTOLIC BLOOD PRESSURE: 130 MMHG | TEMPERATURE: 97 F | WEIGHT: 208 LBS

## 2018-10-05 DIAGNOSIS — I10 BENIGN ESSENTIAL HYPERTENSION: ICD-10-CM

## 2018-10-05 DIAGNOSIS — E11.9 TYPE 2 DIABETES MELLITUS WITHOUT COMPLICATION, WITHOUT LONG-TERM CURRENT USE OF INSULIN (HCC): Primary | ICD-10-CM

## 2018-10-05 DIAGNOSIS — E78.2 MIXED HYPERLIPIDEMIA: ICD-10-CM

## 2018-10-05 DIAGNOSIS — Z23 NEED FOR VACCINATION: ICD-10-CM

## 2018-10-05 LAB
ALBUMIN SERPL-MCNC: 4.5 G/DL (ref 3.6–4.8)
ALBUMIN/GLOB SERPL: 2.4 {RATIO} (ref 1.2–2.2)
ALP SERPL-CCNC: 86 IU/L (ref 39–117)
ALT SERPL-CCNC: 17 IU/L (ref 0–44)
AST SERPL-CCNC: 19 IU/L (ref 0–40)
BILIRUB SERPL-MCNC: 0.3 MG/DL (ref 0–1.2)
BUN SERPL-MCNC: 20 MG/DL (ref 8–27)
BUN/CREAT SERPL: 23 (ref 10–24)
CALCIUM SERPL-MCNC: 9.3 MG/DL (ref 8.6–10.2)
CHLORIDE SERPL-SCNC: 102 MMOL/L (ref 96–106)
CHOLEST SERPL-MCNC: 140 MG/DL (ref 100–199)
CO2 SERPL-SCNC: 25 MMOL/L (ref 20–29)
CREAT SERPL-MCNC: 0.88 MG/DL (ref 0.76–1.27)
ERYTHROCYTE [DISTWIDTH] IN BLOOD BY AUTOMATED COUNT: 14.6 % (ref 12.3–15.4)
EST. AVERAGE GLUCOSE BLD GHB EST-MCNC: 146 MG/DL
GLOBULIN SER-MCNC: 1.9 G/DL (ref 1.5–4.5)
GLUCOSE SERPL-MCNC: 134 MG/DL (ref 65–99)
HBA1C MFR BLD: 6.7 % (ref 4.8–5.6)
HCT VFR BLD AUTO: 42.2 % (ref 37.5–51)
HDLC SERPL-MCNC: 53 MG/DL
HGB BLD-MCNC: 14.4 G/DL (ref 13–17.7)
LDLC SERPL CALC-MCNC: 47 MG/DL (ref 0–99)
LDLC/HDLC SERPL: 0.9 RATIO (ref 0–3.6)
MCH RBC QN AUTO: 29.2 PG (ref 26.6–33)
MCHC RBC AUTO-ENTMCNC: 34.1 G/DL (ref 31.5–35.7)
MCV RBC AUTO: 86 FL (ref 79–97)
MICRODELETION SYND BLD/T FISH: NORMAL
PLATELET # BLD AUTO: 190 X10E3/UL (ref 150–379)
POTASSIUM SERPL-SCNC: 5 MMOL/L (ref 3.5–5.2)
PROT SERPL-MCNC: 6.4 G/DL (ref 6–8.5)
RBC # BLD AUTO: 4.93 X10E6/UL (ref 4.14–5.8)
SL AMB EGFR AFRICAN AMERICAN: 102 ML/MIN/1.73
SL AMB EGFR NON AFRICAN AMERICAN: 88 ML/MIN/1.73
SL AMB VLDL CHOLESTEROL CALC: 40 MG/DL (ref 5–40)
SODIUM SERPL-SCNC: 142 MMOL/L (ref 134–144)
TRIGL SERPL-MCNC: 201 MG/DL (ref 0–149)
WBC # BLD AUTO: 5.4 X10E3/UL (ref 3.4–10.8)

## 2018-10-05 PROCEDURE — 1036F TOBACCO NON-USER: CPT | Performed by: FAMILY MEDICINE

## 2018-10-05 PROCEDURE — 99214 OFFICE O/P EST MOD 30 MIN: CPT | Performed by: FAMILY MEDICINE

## 2018-10-05 PROCEDURE — 90662 IIV NO PRSV INCREASED AG IM: CPT

## 2018-10-05 PROCEDURE — 1160F RVW MEDS BY RX/DR IN RCRD: CPT

## 2018-10-05 PROCEDURE — 3044F HG A1C LEVEL LT 7.0%: CPT | Performed by: FAMILY MEDICINE

## 2018-10-05 PROCEDURE — 90471 IMMUNIZATION ADMIN: CPT

## 2018-10-05 PROCEDURE — 3078F DIAST BP <80 MM HG: CPT | Performed by: FAMILY MEDICINE

## 2018-10-05 PROCEDURE — 3075F SYST BP GE 130 - 139MM HG: CPT | Performed by: FAMILY MEDICINE

## 2018-10-05 PROCEDURE — 1160F RVW MEDS BY RX/DR IN RCRD: CPT | Performed by: FAMILY MEDICINE

## 2018-10-05 NOTE — PATIENT INSTRUCTIONS
Recent Results (from the past 672 hour(s))   CBC    Collection Time: 10/04/18  7:53 AM   Result Value Ref Range    White Blood Cell Count 5 4 3 4 - 10 8 x10E3/uL    Red Blood Cell Count 4 93 4 14 - 5 80 x10E6/uL    Hemoglobin 14 4 13 0 - 17 7 g/dL    HCT 42 2 37 5 - 51 0 %    MCV 86 79 - 97 fL    MCH 29 2 26 6 - 33 0 pg    MCHC 34 1 31 5 - 35 7 g/dL    RDW 14 6 12 3 - 15 4 %    Platelet Count 628 900 - 379 x10E3/uL   Comprehensive metabolic panel    Collection Time: 10/04/18  7:53 AM   Result Value Ref Range    Glucose 134 (H) 65 - 99 mg/dL    BUN 20 8 - 27 mg/dL    Creatinine 0 88 0 76 - 1 27 mg/dL    eGFR Non  88 >59 mL/min/1 73    SL AMB EGFR AFRICAN AMERICAN 102 >59 mL/min/1 73    SL AMB BUN/CREATININE RATIO 23 10 - 24    Sodium 142 134 - 144 mmol/L    SL AMB POTASSIUM 5 0 3 5 - 5 2 mmol/L    Chloride 102 96 - 106 mmol/L    CO2 25 20 - 29 mmol/L    CALCIUM 9 3 8 6 - 10 2 mg/dL    SL AMB PROTEIN, TOTAL 6 4 6 0 - 8 5 g/dL    Albumin 4 5 3 6 - 4 8 g/dL    Globulin, Total 1 9 1 5 - 4 5 g/dL    SL AMB ALBUMIN/GLOBULIN RATIO 2 4 (H) 1 2 - 2 2    TOTAL BILIRUBIN 0 3 0 0 - 1 2 mg/dL    Alk Phos Isoenzymes 86 39 - 117 IU/L    SL AMB AST 19 0 - 40 IU/L    SL AMB ALT 17 0 - 44 IU/L   Hemoglobin A1C    Collection Time: 10/04/18  7:53 AM   Result Value Ref Range    Hemoglobin A1C 6 7 (H) 4 8 - 5 6 %    Estimated Average Glucose 146 mg/dL   Lipid Panel with Direct LDL reflex    Collection Time: 10/04/18  7:53 AM   Result Value Ref Range    Cholesterol, Total 140 100 - 199 mg/dL    Triglycerides 201 (H) 0 - 149 mg/dL    HDL 53 >39 mg/dL    SL AMB VLDL CHOLESTEROL CALC 40 5 - 40 mg/dL    LDL Direct 47 0 - 99 mg/dL    LDl/HDL Ratio 0 9 0 0 - 3 6 ratio   Cardiovascular Report    Collection Time: 10/04/18  7:53 AM   Result Value Ref Range    SL AMB INTERPRETATION Note

## 2018-10-05 NOTE — PROGRESS NOTES
Assessment/Plan:    Problem List Items Addressed This Visit     Benign essential hypertension     Well controlled         Mixed hyperlipidemia     Total cholesterol is down 200  Continue atorvastatin 10 mg a day         Type 2 diabetes mellitus without complication, without long-term current use of insulin (HCC) - Primary     Lab Results   Component Value Date    HGBA1C 6 7 (H) 10/04/2018       No results for input(s): POCGLU in the last 72 hours  Blood Sugar Average: Last 72 hrs:    Diabetes is well controlled           Relevant Medications    liraglutide (VICTOZA) injection    Other Relevant Orders    Comprehensive metabolic panel    Hemoglobin A1C      Other Visit Diagnoses     Need for vaccination        Relevant Orders    influenza vaccine, 1490-1314, high-dose, PF 0 5 mL, for patients 65 yr+ (FLUZONE HIGH-DOSE) (Completed)          Encouraged to take his medication every day  He did not take his seizure medication every day        Patient Instructions     Recent Results (from the past 672 hour(s))   CBC    Collection Time: 10/04/18  7:53 AM   Result Value Ref Range    White Blood Cell Count 5 4 3 4 - 10 8 x10E3/uL    Red Blood Cell Count 4 93 4 14 - 5 80 x10E6/uL    Hemoglobin 14 4 13 0 - 17 7 g/dL    HCT 42 2 37 5 - 51 0 %    MCV 86 79 - 97 fL    MCH 29 2 26 6 - 33 0 pg    MCHC 34 1 31 5 - 35 7 g/dL    RDW 14 6 12 3 - 15 4 %    Platelet Count 899 397 - 379 x10E3/uL   Comprehensive metabolic panel    Collection Time: 10/04/18  7:53 AM   Result Value Ref Range    Glucose 134 (H) 65 - 99 mg/dL    BUN 20 8 - 27 mg/dL    Creatinine 0 88 0 76 - 1 27 mg/dL    eGFR Non  88 >59 mL/min/1 73    SL AMB EGFR AFRICAN AMERICAN 102 >59 mL/min/1 73    SL AMB BUN/CREATININE RATIO 23 10 - 24    Sodium 142 134 - 144 mmol/L    SL AMB POTASSIUM 5 0 3 5 - 5 2 mmol/L    Chloride 102 96 - 106 mmol/L    CO2 25 20 - 29 mmol/L    CALCIUM 9 3 8 6 - 10 2 mg/dL    SL AMB PROTEIN, TOTAL 6 4 6 0 - 8 5 g/dL    Albumin 4 5 3 6 - 4 8 g/dL    Globulin, Total 1 9 1 5 - 4 5 g/dL    SL AMB ALBUMIN/GLOBULIN RATIO 2 4 (H) 1 2 - 2 2    TOTAL BILIRUBIN 0 3 0 0 - 1 2 mg/dL    Alk Phos Isoenzymes 86 39 - 117 IU/L    SL AMB AST 19 0 - 40 IU/L    SL AMB ALT 17 0 - 44 IU/L   Hemoglobin A1C    Collection Time: 10/04/18  7:53 AM   Result Value Ref Range    Hemoglobin A1C 6 7 (H) 4 8 - 5 6 %    Estimated Average Glucose 146 mg/dL   Lipid Panel with Direct LDL reflex    Collection Time: 10/04/18  7:53 AM   Result Value Ref Range    Cholesterol, Total 140 100 - 199 mg/dL    Triglycerides 201 (H) 0 - 149 mg/dL    HDL 53 >39 mg/dL    SL AMB VLDL CHOLESTEROL CALC 40 5 - 40 mg/dL    LDL Direct 47 0 - 99 mg/dL    LDl/HDL Ratio 0 9 0 0 - 3 6 ratio   Cardiovascular Report    Collection Time: 10/04/18  7:53 AM   Result Value Ref Range    SL AMB INTERPRETATION Note            Return in about 3 months (around 1/5/2019) for Next scheduled follow up  Subjective:      Patient ID: Margaret Prasad is a 76 y o  male  Chief Complaint   Patient presents with    Follow-up     review new labs--lj       He is following with the neurologist for his seizures  Hyperlipidemia   This is a chronic problem  The current episode started more than 1 year ago  The problem is controlled  Recent lipid tests were reviewed and are normal  Current antihyperlipidemic treatment includes statins  The current treatment provides moderate improvement of lipids  There are no compliance problems  Hypertension   This is a chronic problem  The current episode started more than 1 year ago  The problem is unchanged  The problem is controlled  Past treatments include angiotensin blockers  The current treatment provides moderate improvement  The following portions of the patient's history were reviewed and updated as appropriate:  past social history    Review of Systems   Respiratory: Negative  Cardiovascular: Negative            Current Outpatient Prescriptions Medication Sig Dispense Refill    ACCU-CHEK GUIDE test strip USE TO TEST 3 TIMES DAILY 300 each 1    aspirin 81 MG tablet Take by mouth daily      atorvastatin (LIPITOR) 10 mg tablet TAKE 1 TABLET BY MOUTH  DAILY 90 tablet 3    Cholecalciferol (BEN-RUL VITAMIN D) 2000 units TABS Take by mouth daily      cyanocobalamin (VITAMIN B-12) 500 mcg tablet Take 500 mcg by mouth daily      Insulin Pen Needle 32G X 4 MM MISC by Does not apply route daily for 90 days 100 each 1    LamoTRIgine  MG TB24 200 mg 2 (two) times a day        levETIRAcetam (KEPPRA XR) 500 MG 24 hr tablet Take 500 mg by mouth Take 1 tablet in the AM and 2 tablets in the PM       liraglutide (VICTOZA) injection Inject 0 3 mL (1 8 mg total) under the skin daily 9 pen 3    losartan (COZAAR) 100 MG tablet Take 1 tablet by mouth daily      Melatonin 3 MG CAPS Take 3 capsules by mouth        metFORMIN (GLUCOPHAGE) 1000 MG tablet TAKE 1 TABLET TWICE A  tablet 3    Multiple Vitamin (MULTI-VITAMIN DAILY PO) Take by mouth daily      Omega-3 Fatty Acids (FISH OIL) 1200 MG CAPS Take by mouth 2 (two) times a day         No current facility-administered medications for this visit  Objective:    /76   Pulse 72   Temp (!) 97 °F (36 1 °C)   Resp 16   Ht 5' 6 5" (1 689 m)   Wt 94 3 kg (208 lb)   BMI 33 07 kg/m²        Physical Exam   Constitutional: He appears well-developed and well-nourished  HENT:   Head: Normocephalic and atraumatic  Right Ear: External ear normal    Left Ear: External ear normal    Mouth/Throat: Oropharynx is clear and moist    Cardiovascular: Normal rate, regular rhythm and normal heart sounds  No murmur heard  Pulmonary/Chest: Effort normal and breath sounds normal  No respiratory distress  He has no wheezes  He has no rales  Musculoskeletal: He exhibits no edema or tenderness  Psychiatric: He has a normal mood and affect  His behavior is normal    Nursing note and vitals reviewed  Jacoby Park, DO

## 2018-10-05 NOTE — ASSESSMENT & PLAN NOTE
Lab Results   Component Value Date    HGBA1C 6 7 (H) 10/04/2018       No results for input(s): POCGLU in the last 72 hours      Blood Sugar Average: Last 72 hrs:    Diabetes is well controlled

## 2018-10-10 DIAGNOSIS — I10 BENIGN ESSENTIAL HYPERTENSION: Primary | ICD-10-CM

## 2018-10-10 PROCEDURE — 4010F ACE/ARB THERAPY RXD/TAKEN: CPT | Performed by: FAMILY MEDICINE

## 2018-10-10 RX ORDER — LOSARTAN POTASSIUM 100 MG/1
TABLET ORAL
Qty: 90 TABLET | Refills: 3 | Status: SHIPPED | OUTPATIENT
Start: 2018-10-10 | End: 2020-03-17

## 2018-12-27 ENCOUNTER — TELEPHONE (OUTPATIENT)
Dept: FAMILY MEDICINE CLINIC | Facility: CLINIC | Age: 68
End: 2018-12-27

## 2018-12-27 NOTE — TELEPHONE ENCOUNTER
Called pt to reschedule his appt for 1/11/18  He rescheduled for 1/25/18 as he needed a Friday appt  He states that he would like to talk to Dr Serena Larry about an upcoming hospital stay he is worried about  Pls call him at earliest convenience

## 2018-12-27 NOTE — TELEPHONE ENCOUNTER
He said it was not urgent to leave a pending message and that Dr Geeta Dupree knows everything that Is going on  I told patient she will be in 1/2/2019 and suggested that he could call then to speak with Dr Trena Jerome  (Sending to Dr Calloway for FYI)  No further action required    Solmon Cranker, MA

## 2019-01-02 NOTE — TELEPHONE ENCOUNTER
1/2/2019 10:50 AM Sonu Dos Santos returned my call  He is going to be hospitalized by his neurologist to monitor seizure activity to get off his current medication      Message silvano Ledezma, DO

## 2019-01-18 LAB
ALBUMIN SERPL-MCNC: 4.3 G/DL (ref 3.6–4.8)
ALBUMIN/GLOB SERPL: 2 {RATIO} (ref 1.2–2.2)
ALP SERPL-CCNC: 91 IU/L (ref 39–117)
ALT SERPL-CCNC: 20 IU/L (ref 0–44)
AST SERPL-CCNC: 15 IU/L (ref 0–40)
BILIRUB SERPL-MCNC: 0.3 MG/DL (ref 0–1.2)
BUN SERPL-MCNC: 21 MG/DL (ref 8–27)
BUN/CREAT SERPL: 23 (ref 10–24)
CALCIUM SERPL-MCNC: 9.4 MG/DL (ref 8.6–10.2)
CHLORIDE SERPL-SCNC: 103 MMOL/L (ref 96–106)
CO2 SERPL-SCNC: 26 MMOL/L (ref 20–29)
CREAT SERPL-MCNC: 0.93 MG/DL (ref 0.76–1.27)
GLOBULIN SER-MCNC: 2.2 G/DL (ref 1.5–4.5)
GLUCOSE SERPL-MCNC: 153 MG/DL (ref 65–99)
HBA1C MFR BLD: 7.2 % (ref 4.8–5.6)
LABCORP COMMENT: NORMAL
POTASSIUM SERPL-SCNC: 4.6 MMOL/L (ref 3.5–5.2)
PROT SERPL-MCNC: 6.5 G/DL (ref 6–8.5)
SL AMB EGFR AFRICAN AMERICAN: 97 ML/MIN/1.73
SL AMB EGFR NON AFRICAN AMERICAN: 84 ML/MIN/1.73
SODIUM SERPL-SCNC: 144 MMOL/L (ref 134–144)

## 2019-02-10 DIAGNOSIS — E11.9 TYPE 2 DIABETES MELLITUS WITHOUT COMPLICATION, WITHOUT LONG-TERM CURRENT USE OF INSULIN (HCC): ICD-10-CM

## 2019-02-10 RX ORDER — PEN NEEDLE, DIABETIC 32GX 5/32"
NEEDLE, DISPOSABLE MISCELLANEOUS
Qty: 90 EACH | Refills: 3 | Status: SHIPPED | OUTPATIENT
Start: 2019-02-10 | End: 2020-01-12

## 2019-02-19 LAB
LEFT EYE DIABETIC RETINOPATHY: NORMAL
RIGHT EYE DIABETIC RETINOPATHY: NORMAL

## 2019-03-14 PROBLEM — K59.03 DRUG-INDUCED CONSTIPATION: Status: RESOLVED | Noted: 2018-06-18 | Resolved: 2019-03-14

## 2019-03-15 ENCOUNTER — OFFICE VISIT (OUTPATIENT)
Dept: FAMILY MEDICINE CLINIC | Facility: CLINIC | Age: 69
End: 2019-03-15
Payer: COMMERCIAL

## 2019-03-15 VITALS
SYSTOLIC BLOOD PRESSURE: 150 MMHG | DIASTOLIC BLOOD PRESSURE: 64 MMHG | BODY MASS INDEX: 32.99 KG/M2 | WEIGHT: 210.2 LBS | TEMPERATURE: 97 F | RESPIRATION RATE: 16 BRPM | HEART RATE: 74 BPM | HEIGHT: 67 IN

## 2019-03-15 DIAGNOSIS — R56.9 SEIZURE (HCC): ICD-10-CM

## 2019-03-15 DIAGNOSIS — E78.2 MIXED HYPERLIPIDEMIA: ICD-10-CM

## 2019-03-15 DIAGNOSIS — Z23 NEED FOR VACCINATION: ICD-10-CM

## 2019-03-15 DIAGNOSIS — I10 BENIGN ESSENTIAL HYPERTENSION: ICD-10-CM

## 2019-03-15 DIAGNOSIS — E11.9 TYPE 2 DIABETES MELLITUS WITHOUT COMPLICATION, WITHOUT LONG-TERM CURRENT USE OF INSULIN (HCC): Primary | ICD-10-CM

## 2019-03-15 PROCEDURE — 1036F TOBACCO NON-USER: CPT | Performed by: FAMILY MEDICINE

## 2019-03-15 PROCEDURE — 1160F RVW MEDS BY RX/DR IN RCRD: CPT | Performed by: FAMILY MEDICINE

## 2019-03-15 PROCEDURE — 99214 OFFICE O/P EST MOD 30 MIN: CPT | Performed by: FAMILY MEDICINE

## 2019-03-15 PROCEDURE — 3008F BODY MASS INDEX DOCD: CPT | Performed by: FAMILY MEDICINE

## 2019-03-15 PROCEDURE — 90471 IMMUNIZATION ADMIN: CPT

## 2019-03-15 PROCEDURE — 1101F PT FALLS ASSESS-DOCD LE1/YR: CPT | Performed by: FAMILY MEDICINE

## 2019-03-15 PROCEDURE — 90732 PPSV23 VACC 2 YRS+ SUBQ/IM: CPT

## 2019-03-15 NOTE — ASSESSMENT & PLAN NOTE
Lab Results   Component Value Date    HGBA1C 7 2 (H) 01/17/2019       No results for input(s): POCGLU in the last 72 hours      Blood Sugar Average: Last 72 hrs:    A1c is up to 7 2

## 2019-03-15 NOTE — PROGRESS NOTES
Assessment/Plan:    Problem List Items Addressed This Visit     Benign essential hypertension     Pressure elevated today  Will have him work on diet and exercise  Recheck pressure in 3 months         Relevant Orders    Comprehensive metabolic panel    CBC    Lipid Panel with Direct LDL reflex    Mixed hyperlipidemia     Well controlled  Will check labs in 3 months         Relevant Orders    Comprehensive metabolic panel    Lipid Panel with Direct LDL reflex    Seizure ([facility] [address] )     Follow with neurologist regularly and recent inpatient testng         Type 2 diabetes mellitus without complication, without long-term current use of insulin ([facility] ) - Primary     Lab Results   Component Value Date    HGBA1C 7 2 (H) 01/17/2019       No results for input(s): POCGLU in the last 72 hours  Blood Sugar Average: Last 72 hrs:    A1c is up to 7 2           Relevant Orders    Microalbumin / creatinine urine ratio    Comprehensive metabolic panel    Hemoglobin A1C    Lipid Panel with Direct LDL reflex      Other Visit Diagnoses     Need for vaccination        Relevant Orders    PNEUMOCOCCAL POLYSACCHARIDE VACCINE 23-VALENT =>1YO SQ IM (Completed)          BMI Counseling: Body mass index is 33 42 kg/m²  Discussed with patient's BMI with him  The BMI is above average  BMI counseling and education was provided to the patient  Exercise recommendations include exercising 3-5 times per week  There are no Patient Instructions on file for this visit  Return in about 3 months (around 6/15/2019) for Next scheduled follow up  Subjective:      Patient ID: Deepa Best is a 76 y o  male  Chief Complaint   Patient presents with    Diabetes    Hypertension     has been high the past 2 times it was taken, per pt   Hyperlipidemia     [facility]       He is seeing his neurologist and is having his medication adjusted  Diabetes   Hypoglycemia symptoms include seizures (had some yesterday)     Hypertension   This is a chronic problem  The current episode started more than 1 year ago  The problem is controlled  Past treatments include angiotensin blockers  The current treatment provides moderate improvement  Compliance problems include exercise  Hyperlipidemia   This is a chronic problem  The current episode started more than 1 year ago  The problem is controlled  Recent lipid tests were reviewed and are normal  Pertinent negatives include no focal weakness  Current antihyperlipidemic treatment includes statins  The current treatment provides moderate improvement of lipids  Compliance problems include adherence to diet  The following portions of the patient's history were reviewed and updated as appropriate:  past social history    Review of Systems   Respiratory: Negative  Cardiovascular: Negative  Neurological: Positive for seizures (had some yesterday)  Negative for focal weakness           Current Outpatient Medications   Medication Sig Dispense Refill    ACCU-CHEK GUIDE test strip USE TO TEST 3 TIMES DAILY 300 each 1    aspirin 81 MG tablet Take by mouth daily      atorvastatin (LIPITOR) 10 mg tablet TAKE 1 TABLET BY MOUTH  DAILY 90 tablet 3    BD PEN NEEDLE JUANCARLOS U/F 32G X 4 MM MISC USE DAILY 90 each 3    Cholecalciferol (BEN-RUL VITAMIN D) 2000 units TABS Take by mouth daily      cyanocobalamin (VITAMIN B-12) 500 mcg tablet Take 500 mcg by mouth daily      LamoTRIgine  MG TB24 250 mg 2 (two) times a day       levETIRAcetam (KEPPRA XR) 500 MG 24 hr tablet Take 500 mg by mouth 2 tablets in the PM      liraglutide (VICTOZA) injection Inject 0 3 mL (1 8 mg total) under the skin daily 9 pen 3    losartan (COZAAR) 100 MG tablet TAKE 1 TABLET DAILY 90 tablet 3    Melatonin 3 MG CAPS Take 3 capsules by mouth        metFORMIN (GLUCOPHAGE) 1000 MG tablet TAKE 1 TABLET TWICE A  tablet 3    Multiple Vitamin (MULTI-VITAMIN DAILY PO) Take by mouth daily      Omega-3 Fatty Acids (FISH OIL) 1200 MG CAPS Take by mouth 2 (two) times a day         No current facility-administered medications for this visit  Objective:    /64   Pulse 74   Temp (!) 97 °F (36 1 °C)   Resp 16   Ht 5' 6 5" (1 689 m)   Wt 95 3 kg (210 lb 3 2 oz)   BMI 33 42 kg/m²        Physical Exam   Constitutional: He appears well-developed and well-nourished  HENT:   Head: Normocephalic and atraumatic  Right Ear: External ear normal    Left Ear: External ear normal    Mouth/Throat: Oropharynx is clear and moist    Cardiovascular: Normal rate, regular rhythm and normal heart sounds  No murmur heard  Pulmonary/Chest: Effort normal and breath sounds normal  No respiratory distress  He has no wheezes  He has no rales  Musculoskeletal: He exhibits no edema or tenderness  Nursing note and vitals reviewed               German Perry DO

## 2019-04-04 DIAGNOSIS — E11.9 TYPE 2 DIABETES MELLITUS WITHOUT COMPLICATION, WITHOUT LONG-TERM CURRENT USE OF INSULIN (HCC): ICD-10-CM

## 2019-04-04 RX ORDER — BLOOD SUGAR DIAGNOSTIC
STRIP MISCELLANEOUS
Qty: 300 EACH | Refills: 1 | Status: SHIPPED | OUTPATIENT
Start: 2019-04-04 | End: 2020-06-04

## 2019-04-12 DIAGNOSIS — E11.9 TYPE 2 DIABETES MELLITUS WITHOUT COMPLICATION, WITHOUT LONG-TERM CURRENT USE OF INSULIN (HCC): ICD-10-CM

## 2019-05-13 DIAGNOSIS — E78.2 MIXED HYPERLIPIDEMIA: ICD-10-CM

## 2019-05-14 RX ORDER — ATORVASTATIN CALCIUM 10 MG/1
TABLET, FILM COATED ORAL
Qty: 90 TABLET | Refills: 3 | Status: SHIPPED | OUTPATIENT
Start: 2019-05-14 | End: 2020-05-22

## 2019-06-17 ENCOUNTER — OFFICE VISIT (OUTPATIENT)
Dept: FAMILY MEDICINE CLINIC | Facility: CLINIC | Age: 69
End: 2019-06-17
Payer: COMMERCIAL

## 2019-06-17 VITALS
BODY MASS INDEX: 31.55 KG/M2 | HEART RATE: 62 BPM | WEIGHT: 201 LBS | DIASTOLIC BLOOD PRESSURE: 64 MMHG | RESPIRATION RATE: 18 BRPM | HEIGHT: 67 IN | SYSTOLIC BLOOD PRESSURE: 128 MMHG | TEMPERATURE: 97.6 F

## 2019-06-17 DIAGNOSIS — I10 BENIGN ESSENTIAL HYPERTENSION: ICD-10-CM

## 2019-06-17 DIAGNOSIS — R56.9 SEIZURE (HCC): ICD-10-CM

## 2019-06-17 DIAGNOSIS — E11.9 TYPE 2 DIABETES MELLITUS WITHOUT COMPLICATION, WITHOUT LONG-TERM CURRENT USE OF INSULIN (HCC): Primary | ICD-10-CM

## 2019-06-17 DIAGNOSIS — E78.2 MIXED HYPERLIPIDEMIA: ICD-10-CM

## 2019-06-17 LAB
ALBUMIN SERPL-MCNC: 4.4 G/DL (ref 3.6–4.8)
ALBUMIN/CREAT UR: 38.9 MG/G CREAT (ref 0–30)
ALBUMIN/GLOB SERPL: 2 {RATIO} (ref 1.2–2.2)
ALP SERPL-CCNC: 105 IU/L (ref 39–117)
ALT SERPL-CCNC: 16 IU/L (ref 0–44)
AST SERPL-CCNC: 18 IU/L (ref 0–40)
BASOPHILS # BLD AUTO: 0 X10E3/UL (ref 0–0.2)
BASOPHILS NFR BLD AUTO: 1 %
BILIRUB SERPL-MCNC: 0.2 MG/DL (ref 0–1.2)
BUN SERPL-MCNC: 18 MG/DL (ref 8–27)
BUN/CREAT SERPL: 19 (ref 10–24)
CALCIUM SERPL-MCNC: 9.4 MG/DL (ref 8.6–10.2)
CHLORIDE SERPL-SCNC: 105 MMOL/L (ref 96–106)
CHOLEST SERPL-MCNC: 102 MG/DL (ref 100–199)
CO2 SERPL-SCNC: 25 MMOL/L (ref 20–29)
CREAT SERPL-MCNC: 0.94 MG/DL (ref 0.76–1.27)
CREAT UR-MCNC: 184.1 MG/DL
EOSINOPHIL # BLD AUTO: 0.2 X10E3/UL (ref 0–0.4)
EOSINOPHIL NFR BLD AUTO: 4 %
ERYTHROCYTE [DISTWIDTH] IN BLOOD BY AUTOMATED COUNT: 14.4 % (ref 12.3–15.4)
GLOBULIN SER-MCNC: 2.2 G/DL (ref 1.5–4.5)
GLUCOSE SERPL-MCNC: 132 MG/DL (ref 65–99)
HBA1C MFR BLD: 6.9 % (ref 4.8–5.6)
HCT VFR BLD AUTO: 41.5 % (ref 37.5–51)
HDLC SERPL-MCNC: 42 MG/DL
HGB BLD-MCNC: 13.5 G/DL (ref 13–17.7)
IMM GRANULOCYTES # BLD: 0 X10E3/UL (ref 0–0.1)
IMM GRANULOCYTES NFR BLD: 0 %
LABCORP COMMENT: NORMAL
LDLC SERPL CALC-MCNC: 34 MG/DL (ref 0–99)
LDLC/HDLC SERPL: 0.8 RATIO (ref 0–3.6)
LYMPHOCYTES # BLD AUTO: 1.5 X10E3/UL (ref 0.7–3.1)
LYMPHOCYTES NFR BLD AUTO: 24 %
MCH RBC QN AUTO: 29.2 PG (ref 26.6–33)
MCHC RBC AUTO-ENTMCNC: 32.5 G/DL (ref 31.5–35.7)
MCV RBC AUTO: 90 FL (ref 79–97)
MICROALBUMIN UR-MCNC: 71.6 UG/ML
MICRODELETION SYND BLD/T FISH: NORMAL
MONOCYTES # BLD AUTO: 0.5 X10E3/UL (ref 0.1–0.9)
MONOCYTES NFR BLD AUTO: 7 %
NEUTROPHILS # BLD AUTO: 4 X10E3/UL (ref 1.4–7)
NEUTROPHILS NFR BLD AUTO: 64 %
PLATELET # BLD AUTO: 193 X10E3/UL (ref 150–450)
POTASSIUM SERPL-SCNC: 4.4 MMOL/L (ref 3.5–5.2)
PROT SERPL-MCNC: 6.6 G/DL (ref 6–8.5)
RBC # BLD AUTO: 4.62 X10E6/UL (ref 4.14–5.8)
SL AMB EGFR AFRICAN AMERICAN: 96 ML/MIN/1.73
SL AMB EGFR NON AFRICAN AMERICAN: 83 ML/MIN/1.73
SL AMB VLDL CHOLESTEROL CALC: 26 MG/DL (ref 5–40)
SODIUM SERPL-SCNC: 144 MMOL/L (ref 134–144)
TRIGL SERPL-MCNC: 129 MG/DL (ref 0–149)
WBC # BLD AUTO: 6.2 X10E3/UL (ref 3.4–10.8)

## 2019-06-17 PROCEDURE — 3074F SYST BP LT 130 MM HG: CPT | Performed by: FAMILY MEDICINE

## 2019-06-17 PROCEDURE — 1160F RVW MEDS BY RX/DR IN RCRD: CPT | Performed by: FAMILY MEDICINE

## 2019-06-17 PROCEDURE — 1036F TOBACCO NON-USER: CPT | Performed by: FAMILY MEDICINE

## 2019-06-17 PROCEDURE — 3078F DIAST BP <80 MM HG: CPT | Performed by: FAMILY MEDICINE

## 2019-06-17 PROCEDURE — 3008F BODY MASS INDEX DOCD: CPT | Performed by: FAMILY MEDICINE

## 2019-06-17 PROCEDURE — 99214 OFFICE O/P EST MOD 30 MIN: CPT | Performed by: FAMILY MEDICINE

## 2019-09-06 LAB
ALBUMIN SERPL-MCNC: 4.4 G/DL (ref 3.6–4.8)
ALBUMIN/GLOB SERPL: 2.4 {RATIO} (ref 1.2–2.2)
ALP SERPL-CCNC: 80 IU/L (ref 39–117)
ALT SERPL-CCNC: 21 IU/L (ref 0–44)
AST SERPL-CCNC: 21 IU/L (ref 0–40)
BILIRUB SERPL-MCNC: 0.4 MG/DL (ref 0–1.2)
BUN SERPL-MCNC: 15 MG/DL (ref 8–27)
BUN/CREAT SERPL: 16 (ref 10–24)
CALCIUM SERPL-MCNC: 9.4 MG/DL (ref 8.6–10.2)
CHLORIDE SERPL-SCNC: 101 MMOL/L (ref 96–106)
CO2 SERPL-SCNC: 26 MMOL/L (ref 20–29)
CREAT SERPL-MCNC: 0.92 MG/DL (ref 0.76–1.27)
GLOBULIN SER-MCNC: 1.8 G/DL (ref 1.5–4.5)
GLUCOSE SERPL-MCNC: 133 MG/DL (ref 65–99)
HBA1C MFR BLD: 6.7 % (ref 4.8–5.6)
POTASSIUM SERPL-SCNC: 4.6 MMOL/L (ref 3.5–5.2)
PROT SERPL-MCNC: 6.2 G/DL (ref 6–8.5)
SL AMB EGFR AFRICAN AMERICAN: 98 ML/MIN/1.73
SL AMB EGFR NON AFRICAN AMERICAN: 85 ML/MIN/1.73
SODIUM SERPL-SCNC: 142 MMOL/L (ref 134–144)

## 2019-09-09 ENCOUNTER — OFFICE VISIT (OUTPATIENT)
Dept: FAMILY MEDICINE CLINIC | Facility: CLINIC | Age: 69
End: 2019-09-09
Payer: COMMERCIAL

## 2019-09-09 VITALS
HEART RATE: 68 BPM | HEIGHT: 67 IN | BODY MASS INDEX: 31.71 KG/M2 | DIASTOLIC BLOOD PRESSURE: 74 MMHG | WEIGHT: 202 LBS | TEMPERATURE: 97.5 F | RESPIRATION RATE: 16 BRPM | SYSTOLIC BLOOD PRESSURE: 134 MMHG

## 2019-09-09 DIAGNOSIS — I10 BENIGN ESSENTIAL HYPERTENSION: ICD-10-CM

## 2019-09-09 DIAGNOSIS — E78.2 MIXED HYPERLIPIDEMIA: ICD-10-CM

## 2019-09-09 DIAGNOSIS — E11.9 TYPE 2 DIABETES MELLITUS WITHOUT COMPLICATION, WITHOUT LONG-TERM CURRENT USE OF INSULIN (HCC): Primary | ICD-10-CM

## 2019-09-09 DIAGNOSIS — R56.9 SEIZURE (HCC): ICD-10-CM

## 2019-09-09 PROCEDURE — 3075F SYST BP GE 130 - 139MM HG: CPT | Performed by: FAMILY MEDICINE

## 2019-09-09 PROCEDURE — 99214 OFFICE O/P EST MOD 30 MIN: CPT | Performed by: FAMILY MEDICINE

## 2019-09-09 PROCEDURE — 3078F DIAST BP <80 MM HG: CPT | Performed by: FAMILY MEDICINE

## 2019-09-09 PROCEDURE — 3008F BODY MASS INDEX DOCD: CPT | Performed by: FAMILY MEDICINE

## 2019-09-09 RX ORDER — LAMOTRIGINE 300 MG/1
300 TABLET, EXTENDED RELEASE ORAL
COMMUNITY

## 2019-09-09 RX ORDER — LEVETIRACETAM 750 MG/1
750 TABLET ORAL
COMMUNITY

## 2019-09-09 RX ORDER — LAMOTRIGINE 50 MG/1
300 TABLET, EXTENDED RELEASE ORAL EVERY EVENING
COMMUNITY

## 2019-09-09 NOTE — ASSESSMENT & PLAN NOTE
Lab Results   Component Value Date    HGBA1C 6 7 (H) 09/05/2019       No results for input(s): POCGLU in the last 72 hours      Blood Sugar Average: Last 72 hrs:    Diabetes - well controlled

## 2019-09-09 NOTE — PROGRESS NOTES
Assessment/Plan:    Problem List Items Addressed This Visit     Benign essential hypertension     Well controlled          Relevant Orders    Comprehensive metabolic panel    CBC    TSH, 3rd generation    Mixed hyperlipidemia     Well controlled          Relevant Orders    Lipid Panel with Direct LDL reflex    Seizure ([facility] [address] )     Seizures have been controlled  Following with neurology         Type 2 diabetes mellitus without complication, without long-term current use of insulin ([facility] [address] ) - Primary     Lab Results   Component Value Date    HGBA1C 6 7 (H) 09/05/2019       No results for input(s): POCGLU in the last 72 hours  Blood Sugar Average: Last 72 hrs:    Diabetes - well controlled            Relevant Medications    liraglutide (VICTOZA) injection    Other Relevant Orders    Comprehensive metabolic panel    Hemoglobin A1C          BMI Counseling: Body mass index is 32 12 kg/m²  Discussed the patient's BMI with him  The BMI is above average  BMI counseling and education was provided to the patient  Exercise recommendations include exercising 3-5 times per week  There are no Patient Instructions on file for this visit  Return in about 3 months (around 12/9/2019) for Annual physical     Subjective:      Patient ID: Mone Adler is a 71 y o  male  Chief Complaint   Patient presents with    Follow-up     new labs-lj       He has been walking his dog more  He has not been watching his diet  Hypertension   This is a chronic problem  The current episode started more than 1 year ago  The problem is unchanged  The problem is controlled  Past treatments include angiotensin blockers  The current treatment provides moderate improvement  There are no compliance problems  The following portions of the patient's history were reviewed and updated as appropriate:  past social history    Review of Systems   Respiratory: Negative  Cardiovascular: Negative            Current Outpatient Medications Medication Sig Dispense Refill    ACCU-CHEK GUIDE test strip USE TO TEST 3 TIMES DAILY 300 each 1    aspirin 81 MG tablet Take by mouth daily      atorvastatin (LIPITOR) 10 mg tablet TAKE 1 TABLET BY MOUTH  DAILY 90 tablet 3    BD PEN NEEDLE JUANCARLOS U/F 32G X 4 MM MISC USE DAILY 90 each 3    Cholecalciferol (BEN-RUL VITAMIN D) 2000 units TABS Take by mouth daily      lamoTRIgine  MG TB24 Take 300 mg by mouth once      lamoTRIgine ER 50 MG TB24 Take 50 mg by mouth once      levETIRAcetam (KEPPRA) 750 mg tablet Take 750 mg by mouth daily at bedtime      liraglutide (VICTOZA) injection Inject 0 3 mL (1 8 mg total) under the skin daily 9 pen 3    losartan (COZAAR) 100 MG tablet TAKE 1 TABLET DAILY 90 tablet 3    Melatonin 3 MG CAPS Take 3 capsules by mouth        metFORMIN (GLUCOPHAGE) 1000 MG tablet TAKE 1 TABLET TWICE A  tablet 3    Multiple Vitamin (MULTI-VITAMIN DAILY PO) Take by mouth daily      Omega-3 Fatty Acids (FISH OIL) 1200 MG CAPS Take by mouth 2 (two) times a day         No current facility-administered medications for this visit  Objective:    /74   Pulse 68   Temp 97 5 °F (36 4 °C)   Resp 16   Ht 5' 6 5" (1 689 m)   Wt 91 6 kg (202 lb)   BMI 32 12 kg/m²        Physical Exam   Constitutional: He appears well-developed and well-nourished  HENT:   Head: Normocephalic and atraumatic  Right Ear: External ear normal    Left Ear: External ear normal    Mouth/Throat: Oropharynx is clear and moist    Cardiovascular: Normal rate, regular rhythm and normal heart sounds  No murmur heard  Pulmonary/Chest: Effort normal and breath sounds normal  No respiratory distress  He has no wheezes  He has no rales  Musculoskeletal: He exhibits no edema or tenderness  Neurological:   Speech slightly slow   Nursing note and vitals reviewed               Carline Gomez DO

## 2019-10-31 NOTE — TELEPHONE ENCOUNTER
1/2/2019 10:44 AM Returned call to Jose     Message left on his voice mail to call the office    Zina Sams DO Last filled 9/17/19 #90    LOV 10/7/19 - medicare wellness exam

## 2020-01-11 DIAGNOSIS — E11.9 TYPE 2 DIABETES MELLITUS WITHOUT COMPLICATION, WITHOUT LONG-TERM CURRENT USE OF INSULIN (HCC): ICD-10-CM

## 2020-01-12 RX ORDER — PEN NEEDLE, DIABETIC 32GX 5/32"
NEEDLE, DISPOSABLE MISCELLANEOUS
Qty: 90 EACH | Refills: 1 | Status: SHIPPED | OUTPATIENT
Start: 2020-01-12

## 2020-01-16 LAB
ALBUMIN SERPL-MCNC: 4.5 G/DL (ref 3.6–4.8)
ALBUMIN/GLOB SERPL: 2.4 {RATIO} (ref 1.2–2.2)
ALP SERPL-CCNC: 84 IU/L (ref 39–117)
ALT SERPL-CCNC: 22 IU/L (ref 0–44)
AST SERPL-CCNC: 20 IU/L (ref 0–40)
BILIRUB SERPL-MCNC: 0.3 MG/DL (ref 0–1.2)
BUN SERPL-MCNC: 19 MG/DL (ref 8–27)
BUN/CREAT SERPL: 20 (ref 10–24)
CALCIUM SERPL-MCNC: 9.3 MG/DL (ref 8.6–10.2)
CHLORIDE SERPL-SCNC: 100 MMOL/L (ref 96–106)
CHOLEST SERPL-MCNC: 130 MG/DL (ref 100–199)
CO2 SERPL-SCNC: 25 MMOL/L (ref 20–29)
CREAT SERPL-MCNC: 0.96 MG/DL (ref 0.76–1.27)
ERYTHROCYTE [DISTWIDTH] IN BLOOD BY AUTOMATED COUNT: 14.3 % (ref 11.6–15.4)
EST. AVERAGE GLUCOSE BLD GHB EST-MCNC: 151 MG/DL
GLOBULIN SER-MCNC: 1.9 G/DL (ref 1.5–4.5)
GLUCOSE SERPL-MCNC: 144 MG/DL (ref 65–99)
HBA1C MFR BLD: 6.9 % (ref 4.8–5.6)
HCT VFR BLD AUTO: 42.5 % (ref 37.5–51)
HDLC SERPL-MCNC: 45 MG/DL
HGB BLD-MCNC: 14.5 G/DL (ref 13–17.7)
LDLC SERPL CALC-MCNC: 48 MG/DL (ref 0–99)
LDLC/HDLC SERPL: 1.1 RATIO (ref 0–3.6)
MCH RBC QN AUTO: 29.5 PG (ref 26.6–33)
MCHC RBC AUTO-ENTMCNC: 34.1 G/DL (ref 31.5–35.7)
MCV RBC AUTO: 86 FL (ref 79–97)
MICRODELETION SYND BLD/T FISH: NORMAL
PLATELET # BLD AUTO: 219 X10E3/UL (ref 150–450)
POTASSIUM SERPL-SCNC: 4.7 MMOL/L (ref 3.5–5.2)
PROT SERPL-MCNC: 6.4 G/DL (ref 6–8.5)
RBC # BLD AUTO: 4.92 X10E6/UL (ref 4.14–5.8)
SL AMB EGFR AFRICAN AMERICAN: 93 ML/MIN/1.73
SL AMB EGFR NON AFRICAN AMERICAN: 80 ML/MIN/1.73
SL AMB VLDL CHOLESTEROL CALC: 37 MG/DL (ref 5–40)
SODIUM SERPL-SCNC: 141 MMOL/L (ref 134–144)
TRIGL SERPL-MCNC: 184 MG/DL (ref 0–149)
TSH SERPL DL<=0.005 MIU/L-ACNC: 2.93 UIU/ML (ref 0.45–4.5)
WBC # BLD AUTO: 6.1 X10E3/UL (ref 3.4–10.8)

## 2020-01-16 PROCEDURE — 3044F HG A1C LEVEL LT 7.0%: CPT | Performed by: FAMILY MEDICINE

## 2020-01-17 ENCOUNTER — OFFICE VISIT (OUTPATIENT)
Dept: FAMILY MEDICINE CLINIC | Facility: CLINIC | Age: 70
End: 2020-01-17
Payer: COMMERCIAL

## 2020-01-17 VITALS
RESPIRATION RATE: 16 BRPM | SYSTOLIC BLOOD PRESSURE: 138 MMHG | DIASTOLIC BLOOD PRESSURE: 80 MMHG | TEMPERATURE: 98.1 F | WEIGHT: 204 LBS | BODY MASS INDEX: 32.02 KG/M2 | HEART RATE: 76 BPM | HEIGHT: 67 IN

## 2020-01-17 DIAGNOSIS — Z00.00 ANNUAL PHYSICAL EXAM: Primary | ICD-10-CM

## 2020-01-17 DIAGNOSIS — I10 BENIGN ESSENTIAL HYPERTENSION: ICD-10-CM

## 2020-01-17 DIAGNOSIS — E11.9 TYPE 2 DIABETES MELLITUS WITHOUT COMPLICATION, WITHOUT LONG-TERM CURRENT USE OF INSULIN (HCC): ICD-10-CM

## 2020-01-17 DIAGNOSIS — E78.2 MIXED HYPERLIPIDEMIA: ICD-10-CM

## 2020-01-17 DIAGNOSIS — R56.9 SEIZURE (HCC): ICD-10-CM

## 2020-01-17 DIAGNOSIS — Z23 NEED FOR VACCINATION: ICD-10-CM

## 2020-01-17 PROCEDURE — 90471 IMMUNIZATION ADMIN: CPT

## 2020-01-17 PROCEDURE — 1160F RVW MEDS BY RX/DR IN RCRD: CPT

## 2020-01-17 PROCEDURE — 90662 IIV NO PRSV INCREASED AG IM: CPT

## 2020-01-17 PROCEDURE — 3008F BODY MASS INDEX DOCD: CPT | Performed by: FAMILY MEDICINE

## 2020-01-17 PROCEDURE — 3288F FALL RISK ASSESSMENT DOCD: CPT | Performed by: FAMILY MEDICINE

## 2020-01-17 PROCEDURE — 3079F DIAST BP 80-89 MM HG: CPT | Performed by: FAMILY MEDICINE

## 2020-01-17 PROCEDURE — 1101F PT FALLS ASSESS-DOCD LE1/YR: CPT | Performed by: FAMILY MEDICINE

## 2020-01-17 PROCEDURE — 99397 PER PM REEVAL EST PAT 65+ YR: CPT | Performed by: FAMILY MEDICINE

## 2020-01-17 PROCEDURE — 3075F SYST BP GE 130 - 139MM HG: CPT | Performed by: FAMILY MEDICINE

## 2020-01-17 NOTE — ASSESSMENT & PLAN NOTE
Lab Results   Component Value Date    HGBA1C 6 9 (H) 01/15/2020       Diabetes is well controlled  Diabetic eye exam is due next month - form given

## 2020-01-17 NOTE — PROGRESS NOTES
FAMILY PRACTICE HEALTH MAINTENANCE OFFICE VISIT  [facility]    NAME: Teo Clemente  AGE: 71 y o  SEX: male  : 1950     DATE: 2020    Assessment and Plan     1  Annual physical exam    2  Type 2 diabetes mellitus without complication, without long-term current use of insulin (HCC)  Assessment & Plan:    Lab Results   Component Value Date    HGBA1C 6 9 (H) 01/15/2020       Diabetes is well controlled  Diabetic eye exam is due next month - form given    Orders:  -     Comprehensive metabolic panel; Future; Expected date: 2020  -     Hemoglobin A1C; Future; Expected date: 2020  -     Comprehensive metabolic panel  -     Hemoglobin A1C    3  Benign essential hypertension  Assessment & Plan:  Controlled  Continue losartan 100 mg a day      4  Mixed hyperlipidemia  Assessment & Plan:  Well controlled  Continue atorvastatin 10 mg      5  Seizure [facility])  Assessment & Plan:  Stable  Following with neurologist       6  Need for vaccination  -     influenza vaccine, high-dose, PF 0 5 mL      · Patient Counseling:   · Nutrition: Stressed importance of a well balanced diet, moderation of sodium/saturated fat, caloric balance and sufficient intake of fiber  · Exercise: Stressed the importance of regular exercise with a goal of 150 minutes per week  · Dental Health: Discussed daily flossing and brushing and regular dental visits     · Immunizations reviewed: See Orders  · Discussed benefits of:  Colon Cancer Screening and Screening labs   BMI Counseling: Body mass index is 32 43 kg/m²  Discussed with patient's BMI with him  The BMI is above normal  Exercise recommendations include exercising 3-5 times per week  Return in about 3 months (around 2020) for Next scheduled follow up  Chief Complaint     Chief Complaint   Patient presents with    Physical Exam     prcma       History of Present Illness     He reports eating more candy at work   He is now working [employer]  He likes everyone there and every one is friendly  He is now temporarily living in his child monsivais home  He still has his local home  He has started meditating and is using [facility] [facility]   Patient here for a comprehensive physical exam       Diet and Physical Activity  Diet: well balanced diet  Exercise: walking his dog every day      Depression Screen  PHQ-9 Depression Screening    PHQ-9:    Frequency of the following problems over the past two weeks:       Little interest or pleasure in doing things:  0 - not at all  Feeling down, depressed, or hopeless:  0 - not at all  PHQ-2 Score:  0          General Health  Hearing: Normal:  bilateral  Vision: wears glasses  Dental: regular dental visits    Reproductive Health  No issues       The following portions of the patient's history were reviewed and updated as appropriate: allergies, current medications, past family history, past medical history, past social history, past surgical history and problem list     Review of Systems     Review of Systems   Respiratory: Negative  Cardiovascular: Negative          Past Medical History     Past Medical History:   Diagnosis Date    Colitis     last assessed: Nov 21, 2013    Incisional hernia     last assessed/onset: March 12, 2012    Tinnitus, bilateral     resolved: July 7, 7838    Umbilical hernia     onset/last assessed: Dec 19, 2008       Past Surgical History     Past Surgical History:   Procedure Laterality Date    CRANIOTOMY      Supratentorial Excision of Meningioma - Last assessed: July 7, 2016       Social History     Social History     Socioeconomic History    Marital status: Single     Spouse name: None    Number of children: None    Years of education: None    Highest education level: None   Occupational History    None   Social Needs    Financial resource strain: None    Food insecurity:     Worry: None     Inability: None    Transportation needs: Medical: None     Non-medical: None   Tobacco Use    Smoking status: Former Smoker     Last attempt to quit: 1980     Years since quittin 6    Smokeless tobacco: Never Used   Substance and Sexual Activity    Alcohol use: Yes     Frequency: Monthly or less     Drinks per session: 1 or 2     Binge frequency: Never    Drug use: Never    Sexual activity: None   Lifestyle    Physical activity:     Days per week: None     Minutes per session: None    Stress: None   Relationships    Social connections:     Talks on phone: None     Gets together: None     Attends Holiness service: None     Active member of club or organization: None     Attends meetings of clubs or organizations: None     Relationship status: None    Intimate partner violence:     Fear of current or ex partner: None     Emotionally abused: None     Physically abused: None     Forced sexual activity: None   Other Topics Concern    None   Social History Narrative    None       Family History     Family History   Problem Relation Age of Onset    Glaucoma Mother     Colon cancer Father     Cancer Father         bladder     Paget's disease of bone Father     Glaucoma Maternal Grandmother     Colon cancer Paternal Grandmother        Current Medications       Current Outpatient Medications:     ACCU-CHEK GUIDE test strip, USE TO TEST 3 TIMES DAILY, Disp: 300 each, Rfl: 1    aspirin 81 MG tablet, Take by mouth daily, Disp: , Rfl:     atorvastatin (LIPITOR) 10 mg tablet, TAKE 1 TABLET BY MOUTH  DAILY, Disp: 90 tablet, Rfl: 3    BD PEN NEEDLE JUANCARLOS U/F 32G X 4 MM MISC, USE SUBCUTANEOUSLY DAILY, Disp: 90 each, Rfl: 1    Cholecalciferol (BEN-RUL VITAMIN D) 2000 units TABS, Take by mouth daily, Disp: , Rfl:     Coenzyme Q10 (COQ10 PO), Take by mouth, Disp: , Rfl:     lamoTRIgine  MG TB24, Take 300 mg by mouth daily in the early morning , Disp: , Rfl:     lamoTRIgine ER 50 MG TB24, Take 300 mg by mouth every evening , Disp: , Rfl:     levETIRAcetam (KEPPRA) 750 mg tablet, Take 750 mg by mouth daily at bedtime, Disp: , Rfl:     liraglutide (VICTOZA) injection, Inject 0 3 mL (1 8 mg total) under the skin daily, Disp: 9 pen, Rfl: 3    losartan (COZAAR) 100 MG tablet, TAKE 1 TABLET DAILY, Disp: 90 tablet, Rfl: 3    Melatonin 3 MG CAPS, Take 3 capsules by mouth  , Disp: , Rfl:     metFORMIN (GLUCOPHAGE) 1000 MG tablet, TAKE 1 TABLET BY MOUTH  TWICE A DAY, Disp: 180 tablet, Rfl: 1    Multiple Vitamin (MULTI-VITAMIN DAILY PO), Take by mouth daily, Disp: , Rfl:     Omega-3 Fatty Acids (FISH OIL) 1200 MG CAPS, Take by mouth 2 (two) times a day  , Disp: , Rfl:      Allergies     Allergies   Allergen Reactions    Hydrochlorothiazide      Raised blood sugar    Amoxicillin Diarrhea     Annotation - [id number]: caused colitis that caused bleeding  Objective     /80   Pulse 76   Temp 98 1 °F (36 7 °C)   Resp 16   Ht 5' 6 5" (1 689 m)   Wt 92 5 kg (204 lb)   BMI 32 43 kg/m²      Physical Exam   Constitutional: He is oriented to person, place, and time  He appears well-developed and well-nourished  HENT:   Head: Normocephalic and atraumatic  Right Ear: External ear normal    Left Ear: External ear normal    Mouth/Throat: Oropharynx is clear and moist    Neck: Normal range of motion  Cardiovascular: Normal rate, regular rhythm and normal heart sounds  No murmur heard  Pulmonary/Chest: Effort normal and breath sounds normal  No respiratory distress  He has no wheezes  He has no rales  Abdominal: Soft  Bowel sounds are normal  He exhibits no distension  There is no tenderness  There is no rebound  Musculoskeletal: Normal range of motion  He exhibits no edema  Neurological: He is alert and oriented to person, place, and time  He has normal reflexes  Skin: He is not diaphoretic  Nursing note and vitals reviewed           Visual Acuity Screening    Right eye Left eye Both eyes   Without correction: 20/40 20/40 20/40 With correction:              Deneen Bolden, [address]

## 2020-03-16 DIAGNOSIS — I10 BENIGN ESSENTIAL HYPERTENSION: ICD-10-CM

## 2020-03-17 PROCEDURE — 4010F ACE/ARB THERAPY RXD/TAKEN: CPT | Performed by: FAMILY MEDICINE

## 2020-03-17 RX ORDER — LOSARTAN POTASSIUM 100 MG/1
TABLET ORAL
Qty: 90 TABLET | Refills: 3 | Status: SHIPPED | OUTPATIENT
Start: 2020-03-17

## 2020-05-22 DIAGNOSIS — E78.2 MIXED HYPERLIPIDEMIA: ICD-10-CM

## 2020-05-22 RX ORDER — ATORVASTATIN CALCIUM 10 MG/1
TABLET, FILM COATED ORAL
Qty: 90 TABLET | Refills: 1 | Status: SHIPPED | OUTPATIENT
Start: 2020-05-22

## 2020-06-04 DIAGNOSIS — E11.9 TYPE 2 DIABETES MELLITUS WITHOUT COMPLICATION, WITHOUT LONG-TERM CURRENT USE OF INSULIN (HCC): ICD-10-CM

## 2020-06-04 RX ORDER — BLOOD SUGAR DIAGNOSTIC
STRIP MISCELLANEOUS
Qty: 300 EACH | Refills: 1 | Status: SHIPPED | OUTPATIENT
Start: 2020-06-04

## 2020-06-26 DIAGNOSIS — E11.9 TYPE 2 DIABETES MELLITUS WITHOUT COMPLICATION, WITHOUT LONG-TERM CURRENT USE OF INSULIN (HCC): ICD-10-CM

## 2020-06-26 NOTE — TELEPHONE ENCOUNTER
Patient was supposed to f/u with Dr Kiana Salazar back in April, please call to schedule appointment  Thank you    Aubrey Collins MA

## 2020-12-17 DIAGNOSIS — I10 BENIGN ESSENTIAL HYPERTENSION: ICD-10-CM

## 2020-12-18 RX ORDER — LOSARTAN POTASSIUM 100 MG/1
TABLET ORAL
Qty: 90 TABLET | Refills: 3 | OUTPATIENT
Start: 2020-12-18

## 2021-02-24 ENCOUNTER — TELEPHONE (OUTPATIENT)
Dept: FAMILY MEDICINE CLINIC | Facility: CLINIC | Age: 71
End: 2021-02-24

## 2021-02-24 NOTE — TELEPHONE ENCOUNTER
----- Message from Kristopher Shell DO sent at 2/24/2021  8:44 AM EST -----  Good morning,  Mr Jose Elias Cameron has moved and will not be coming here anymore  Can I be removed as the PCP?   Thank you,  Kristopher Shell, DO

## 2021-03-09 NOTE — TELEPHONE ENCOUNTER
03/08/21 7:34 PM     Thank you for your request  Your request has been received, reviewed, and the patient chart updated  The PCP has successfully been removed with a patient attribution note  This message will now be completed      Thank you  Elia Arce